# Patient Record
Sex: FEMALE | Race: WHITE | Employment: OTHER | URBAN - METROPOLITAN AREA
[De-identification: names, ages, dates, MRNs, and addresses within clinical notes are randomized per-mention and may not be internally consistent; named-entity substitution may affect disease eponyms.]

---

## 2018-12-26 ENCOUNTER — HOSPITAL ENCOUNTER (EMERGENCY)
Age: 68
Discharge: HOME | End: 2018-12-26
Attending: EMERGENCY MEDICINE
Payer: MEDICARE

## 2018-12-26 ENCOUNTER — APPOINTMENT (OUTPATIENT)
Dept: GENERAL RADIOLOGY | Age: 68
End: 2018-12-26
Attending: EMERGENCY MEDICINE
Payer: MEDICARE

## 2018-12-26 VITALS
BODY MASS INDEX: 26.75 KG/M2 | HEIGHT: 66 IN | DIASTOLIC BLOOD PRESSURE: 84 MMHG | RESPIRATION RATE: 14 BRPM | TEMPERATURE: 97.6 F | HEART RATE: 66 BPM | OXYGEN SATURATION: 93 % | SYSTOLIC BLOOD PRESSURE: 173 MMHG | WEIGHT: 166.45 LBS

## 2018-12-26 DIAGNOSIS — K21.9 GASTROESOPHAGEAL REFLUX DISEASE, ESOPHAGITIS PRESENCE NOT SPECIFIED: Primary | ICD-10-CM

## 2018-12-26 LAB
ALBUMIN SERPL-MCNC: 3.5 G/DL (ref 3.5–5)
ALBUMIN/GLOB SERPL: 1.2 {RATIO} (ref 1.1–2.2)
ALP SERPL-CCNC: 73 U/L (ref 45–117)
ALT SERPL-CCNC: 28 U/L (ref 12–78)
ANION GAP SERPL CALC-SCNC: 9 MMOL/L (ref 5–15)
APPEARANCE UR: CLEAR
AST SERPL-CCNC: 15 U/L (ref 15–37)
ATRIAL RATE: 66 BPM
BASOPHILS # BLD: 0 K/UL (ref 0–0.1)
BASOPHILS NFR BLD: 0 % (ref 0–1)
BILIRUB SERPL-MCNC: 0.4 MG/DL (ref 0.2–1)
BILIRUB UR QL: NEGATIVE
BUN SERPL-MCNC: 14 MG/DL (ref 6–20)
BUN/CREAT SERPL: 20 (ref 12–20)
CALCIUM SERPL-MCNC: 8.5 MG/DL (ref 8.5–10.1)
CALCULATED P AXIS, ECG09: 49 DEGREES
CALCULATED R AXIS, ECG10: 12 DEGREES
CALCULATED T AXIS, ECG11: 18 DEGREES
CHLORIDE SERPL-SCNC: 101 MMOL/L (ref 97–108)
CO2 SERPL-SCNC: 29 MMOL/L (ref 21–32)
COLOR UR: NORMAL
COMMENT, HOLDF: NORMAL
CREAT SERPL-MCNC: 0.69 MG/DL (ref 0.55–1.02)
DIAGNOSIS, 93000: NORMAL
DIFFERENTIAL METHOD BLD: ABNORMAL
EOSINOPHIL # BLD: 0.2 K/UL (ref 0–0.4)
EOSINOPHIL NFR BLD: 3 % (ref 0–7)
ERYTHROCYTE [DISTWIDTH] IN BLOOD BY AUTOMATED COUNT: 13.2 % (ref 11.5–14.5)
FLUAV AG NPH QL IA: NEGATIVE
FLUBV AG NOSE QL IA: NEGATIVE
GLOBULIN SER CALC-MCNC: 2.9 G/DL (ref 2–4)
GLUCOSE SERPL-MCNC: 103 MG/DL (ref 65–100)
GLUCOSE UR STRIP.AUTO-MCNC: NEGATIVE MG/DL
HCT VFR BLD AUTO: 40.1 % (ref 35–47)
HGB BLD-MCNC: 13.5 G/DL (ref 11.5–16)
HGB UR QL STRIP: NEGATIVE
IMM GRANULOCYTES # BLD: 0 K/UL (ref 0–0.04)
IMM GRANULOCYTES NFR BLD AUTO: 0 % (ref 0–0.5)
KETONES UR QL STRIP.AUTO: NEGATIVE MG/DL
LDH SERPL L TO P-CCNC: 230 U/L (ref 81–246)
LEUKOCYTE ESTERASE UR QL STRIP.AUTO: NEGATIVE
LIPASE SERPL-CCNC: 151 U/L (ref 73–393)
LYMPHOCYTES # BLD: 3.1 K/UL (ref 0.8–3.5)
LYMPHOCYTES NFR BLD: 35 % (ref 12–49)
MCH RBC QN AUTO: 29.8 PG (ref 26–34)
MCHC RBC AUTO-ENTMCNC: 33.7 G/DL (ref 30–36.5)
MCV RBC AUTO: 88.5 FL (ref 80–99)
MONOCYTES # BLD: 0.7 K/UL (ref 0–1)
MONOCYTES NFR BLD: 8 % (ref 5–13)
NEUTS SEG # BLD: 4.7 K/UL (ref 1.8–8)
NEUTS SEG NFR BLD: 54 % (ref 32–75)
NITRITE UR QL STRIP.AUTO: NEGATIVE
NRBC # BLD: 0 K/UL (ref 0–0.01)
NRBC BLD-RTO: 0 PER 100 WBC
P-R INTERVAL, ECG05: 164 MS
PH UR STRIP: 6 [PH] (ref 5–8)
PLATELET # BLD AUTO: 236 K/UL (ref 150–400)
PMV BLD AUTO: 8.8 FL (ref 8.9–12.9)
POTASSIUM SERPL-SCNC: 3.8 MMOL/L (ref 3.5–5.1)
PROT SERPL-MCNC: 6.4 G/DL (ref 6.4–8.2)
PROT UR STRIP-MCNC: NEGATIVE MG/DL
Q-T INTERVAL, ECG07: 390 MS
QRS DURATION, ECG06: 80 MS
QTC CALCULATION (BEZET), ECG08: 408 MS
RBC # BLD AUTO: 4.53 M/UL (ref 3.8–5.2)
SAMPLES BEING HELD,HOLD: NORMAL
SODIUM SERPL-SCNC: 139 MMOL/L (ref 136–145)
SP GR UR REFRACTOMETRY: 1.01 (ref 1–1.03)
TROPONIN I SERPL-MCNC: <0.05 NG/ML
URATE SERPL-MCNC: 3.3 MG/DL (ref 2.6–6)
UROBILINOGEN UR QL STRIP.AUTO: 0.2 EU/DL (ref 0.2–1)
VENTRICULAR RATE, ECG03: 66 BPM
WBC # BLD AUTO: 8.7 K/UL (ref 3.6–11)

## 2018-12-26 PROCEDURE — 83615 LACTATE (LD) (LDH) ENZYME: CPT

## 2018-12-26 PROCEDURE — 99284 EMERGENCY DEPT VISIT MOD MDM: CPT

## 2018-12-26 PROCEDURE — 87804 INFLUENZA ASSAY W/OPTIC: CPT

## 2018-12-26 PROCEDURE — 84550 ASSAY OF BLOOD/URIC ACID: CPT

## 2018-12-26 PROCEDURE — 71046 X-RAY EXAM CHEST 2 VIEWS: CPT

## 2018-12-26 PROCEDURE — 80053 COMPREHEN METABOLIC PANEL: CPT

## 2018-12-26 PROCEDURE — 83690 ASSAY OF LIPASE: CPT

## 2018-12-26 PROCEDURE — 84484 ASSAY OF TROPONIN QUANT: CPT

## 2018-12-26 PROCEDURE — 93005 ELECTROCARDIOGRAM TRACING: CPT

## 2018-12-26 PROCEDURE — 85025 COMPLETE CBC W/AUTO DIFF WBC: CPT

## 2018-12-26 PROCEDURE — 81003 URINALYSIS AUTO W/O SCOPE: CPT

## 2018-12-26 PROCEDURE — 36415 COLL VENOUS BLD VENIPUNCTURE: CPT

## 2018-12-26 RX ORDER — AA/PROT/LYSINE/METHIO/VIT C/B6 50-12.5 MG
10 TABLET ORAL
COMMUNITY
End: 2019-08-29 | Stop reason: SDUPTHER

## 2018-12-26 RX ORDER — CHOLECALCIFEROL TAB 125 MCG (5000 UNIT) 125 MCG
5000 TAB ORAL DAILY
COMMUNITY

## 2018-12-26 RX ORDER — SIMVASTATIN 10 MG/1
10 TABLET, FILM COATED ORAL
COMMUNITY
End: 2019-06-18 | Stop reason: SDUPTHER

## 2018-12-26 RX ORDER — GLUCOSAMINE/CHONDR SU A SOD 750-600 MG
2500 TABLET ORAL
COMMUNITY

## 2018-12-26 RX ORDER — HYDROGEN PEROXIDE 3 %
20 SOLUTION, NON-ORAL MISCELLANEOUS DAILY
Qty: 20 CAP | Refills: 0 | Status: SHIPPED | OUTPATIENT
Start: 2018-12-26 | End: 2019-01-15

## 2018-12-26 RX ORDER — ASPIRIN 81 MG/1
81 TABLET ORAL DAILY
COMMUNITY

## 2018-12-26 NOTE — ED PROVIDER NOTES
76 y.o. Female with a hx of lymphoma, not currently on chemo, in remission, GERD, with hx of prior PPI but not now presents to the ED noting a 5 day history of waxing and waning epigastric pain that she describes as a tightness that wraps around the top of her back. She states that she her sx are worse after eating and during the night. She has associated nausea but no vomiting, no diarrhea or constipation no blood in her stool She has been taking pepto bismol and tums intermittently as well as taking some of her 's prilosec to good effect to her sx. She denies any pain currently, states that she took this medication PTA. She says that last night she did feel some night sweats, some myalgias, and some light-headedness with position changes, but denies any HA, vision changes, numbness, weakness, chest pain, SOB, fever, chills, URI sx, sore throat, cough dysuria, hematuria, or other medical complaints. No hx of prior ACS or other comorbidities. Occasional ETOH intake. No hx of HTN, on no antihypertensives. Patient states that she feels anxious and is planning an extended trip to [facility] and wants to be checked out prior to leaving. She used to follow with a GI doctor in [place], but has not seen them in a while. Additionally she states that she has had a sick contact with a relative who was recently diagnosed with the flu about 2 weeks ago. History reviewed. No pertinent past medical history. History reviewed. No pertinent surgical history. History reviewed. No pertinent family history.     Social History     Socioeconomic History    Marital status:      Spouse name: Not on file    Number of children: Not on file    Years of education: Not on file    Highest education level: Not on file   Social Needs    Financial resource strain: Not on file    Food insecurity - worry: Not on file    Food insecurity - inability: Not on file    Transportation needs - medical: Not on file   Freda Transportation needs - non-medical: Not on file   Occupational History    Not on file   Tobacco Use    Smoking status: Never Smoker    Smokeless tobacco: Never Used   Substance and Sexual Activity    Alcohol use: Yes     Alcohol/week: 3.6 oz     Types: 6 Glasses of wine per week     Frequency: Never    Drug use: No    Sexual activity: Not on file   Other Topics Concern    Not on file   Social History Narrative    Not on file         ALLERGIES: Other plant, animal, environmental    Review of Systems   Constitutional: Positive for appetite change and diaphoresis (night sweats). Negative for activity change, chills and fever. HENT: Negative for congestion, rhinorrhea, sinus pressure, sneezing and sore throat. Eyes: Negative for photophobia and visual disturbance. Respiratory: Positive for chest tightness. Negative for cough and shortness of breath. Cardiovascular: Negative for chest pain, palpitations and leg swelling. Gastrointestinal: Positive for abdominal pain and nausea. Negative for abdominal distention, blood in stool, constipation, diarrhea and vomiting. Genitourinary: Negative for difficulty urinating, dysuria, flank pain, frequency, hematuria, menstrual problem, urgency, vaginal bleeding and vaginal discharge. Musculoskeletal: Negative for arthralgias, back pain, myalgias and neck pain. Skin: Negative for rash and wound. Neurological: Positive for light-headedness. Negative for dizziness, syncope, facial asymmetry, speech difficulty, weakness, numbness and headaches. Psychiatric/Behavioral: Negative for self-injury and suicidal ideas. All other systems reviewed and are negative. Vitals:    12/26/18 1021   BP: (!) 206/107   Pulse: 70   Resp: 14   Temp: 97.6 °F (36.4 °C)   SpO2: 98%   Weight: 75.5 kg (166 lb 7.2 oz)   Height: 5' 6.14\" (1.68 m)            Physical Exam   Constitutional: She is oriented to person, place, and time. She appears well-developed and well-nourished. No distress. HENT:   Head: Normocephalic and atraumatic. Nose: Nose normal.   Mouth/Throat: Oropharynx is clear and moist.   Eyes: Conjunctivae and EOM are normal. Pupils are equal, round, and reactive to light. Neck: Neck supple. Cardiovascular: Normal rate, regular rhythm, normal heart sounds and intact distal pulses. Pulmonary/Chest: Effort normal and breath sounds normal.   Abdominal: Soft. She exhibits no distension. There is no tenderness. There is no CVA tenderness. Musculoskeletal: She exhibits no edema or tenderness. Neurological: She is alert and oriented to person, place, and time. She has normal strength. No cranial nerve deficit or sensory deficit. Coordination normal. GCS eye subscore is 4. GCS verbal subscore is 5. GCS motor subscore is 6. Skin: Skin is warm and dry. She is not diaphoretic. Nursing note and vitals reviewed. MDM    76 y.o. female with hx of prior GERD, lymphoma and recent ill contact presents with intermittent upper abd pain and chest tightness. Seems to be associated with eating, atypical chest tightness. Reassuring exam, benign abdomen. BP initially elevated    Labs were drawn and returned showing no acute abnormalities, including neg troponin, normal lipase, LFTs, electrolytes, renal fxn, normal uric acid, and LDH. CXR was done, viewed by myself and read by radiology showing no acute abnormalities. Influenza neg    UA neg    EKG at 1025 shows NSR with a rate of 66bpm with no acute ST or T wave abnormalities suggestive of ischemia, no arrhythmia. BP improved with rest without intervention. rec'd PCP follow up for further evaluation of HTN.      Procedures

## 2018-12-26 NOTE — ED NOTES
The patient was discharged home by Dr Ana M Ivey in stable condition. The patient is alert and oriented, in no respiratory distress and discharge vital signs obtained. The patient's diagnosis, condition and treatment were explained. The patient expressed understanding. One prescription given. A discharge plan has been developed. Aftercare instructions were given. Pt ambulatory out of the ED.

## 2019-03-29 ENCOUNTER — HOSPITAL ENCOUNTER (OUTPATIENT)
Dept: GENERAL RADIOLOGY | Age: 69
Discharge: HOME | End: 2019-03-29
Payer: MEDICARE

## 2019-03-29 ENCOUNTER — OFFICE VISIT (OUTPATIENT)
Dept: PRIMARY CARE CLINIC | Age: 69
End: 2019-03-29

## 2019-03-29 VITALS
WEIGHT: 165.2 LBS | TEMPERATURE: 98.3 F | OXYGEN SATURATION: 92 % | RESPIRATION RATE: 16 BRPM | BODY MASS INDEX: 27.52 KG/M2 | SYSTOLIC BLOOD PRESSURE: 158 MMHG | HEIGHT: 65 IN | HEART RATE: 81 BPM | DIASTOLIC BLOOD PRESSURE: 85 MMHG

## 2019-03-29 DIAGNOSIS — R05.9 COUGH: ICD-10-CM

## 2019-03-29 DIAGNOSIS — R09.81 NASAL CONGESTION: ICD-10-CM

## 2019-03-29 DIAGNOSIS — J06.9 ACUTE RESPIRATORY DISEASE: ICD-10-CM

## 2019-03-29 DIAGNOSIS — R07.81 PLEURITIC PAIN: ICD-10-CM

## 2019-03-29 DIAGNOSIS — J06.9 UPPER RESPIRATORY TRACT INFECTION, UNSPECIFIED TYPE: Primary | ICD-10-CM

## 2019-03-29 PROCEDURE — 71046 X-RAY EXAM CHEST 2 VIEWS: CPT

## 2019-03-29 RX ORDER — FLUTICASONE PROPIONATE 50 MCG
SPRAY, SUSPENSION (ML) NASAL
Qty: 1 BOTTLE | Refills: 0 | Status: SHIPPED | OUTPATIENT
Start: 2019-03-29 | End: 2021-07-27

## 2019-03-29 RX ORDER — BENZONATATE 200 MG/1
200 CAPSULE ORAL
Qty: 21 CAP | Refills: 0 | Status: SHIPPED | OUTPATIENT
Start: 2019-03-29 | End: 2019-04-05

## 2019-03-29 RX ORDER — SIMVASTATIN 20 MG/1
TABLET, FILM COATED ORAL
COMMUNITY
Start: 2019-02-10 | End: 2019-08-29 | Stop reason: SDUPTHER

## 2019-03-29 RX ORDER — CHOLECALCIFEROL (VITAMIN D3) 125 MCG
CAPSULE ORAL
COMMUNITY

## 2019-03-29 RX ORDER — CETIRIZINE HCL 10 MG
10 TABLET ORAL
Qty: 30 TAB | Refills: 1 | Status: SHIPPED | OUTPATIENT
Start: 2019-03-29

## 2019-03-29 RX ORDER — ASPIRIN 81 MG/1
TABLET ORAL DAILY
COMMUNITY
End: 2019-08-29 | Stop reason: SDUPTHER

## 2019-03-29 RX ORDER — ALPRAZOLAM 0.25 MG/1
TABLET ORAL
COMMUNITY
End: 2019-06-18 | Stop reason: SDUPTHER

## 2019-03-29 RX ORDER — AZITHROMYCIN 250 MG/1
TABLET, FILM COATED ORAL
Qty: 6 TAB | Refills: 0 | Status: SHIPPED | OUTPATIENT
Start: 2019-03-29 | End: 2019-04-03

## 2019-03-29 RX ORDER — AA/PROT/LYSINE/METHIO/VIT C/B6 50-12.5 MG
10 TABLET ORAL DAILY
COMMUNITY

## 2019-03-29 NOTE — PROGRESS NOTES
[facility]   [address] [facility], [facility]  P: [phone]  F: [phone]      Chief Complaint   Patient presents with    Cold Symptoms     started with cold symtoms but denies and fever, nausea. states that started 5 days ago and the cough is causing pain. states congested in chest and head.   ear pain as well. Alejandro King is a 76 y.o. female who presents to clinic for Cold Symptoms (started with cold symtoms but denies and fever, nausea. states that started 5 days ago and the cough is causing pain. states congested in chest and head.   ear pain as well. ). HPI:  The patient presents for walk-in acute care visit with 5-6 days of days of chest congestion, chest tightness, and cough productive of sputum. She is endorsing rib/ pleuritic pain to left side and wondering if she has pneumonia. She is using OTC Vicks, Zycam, vitamin C, Advil, pseudophed, and robitussin with minimal relief. Hx of non hodgkins lymphoma in remission follwed by Ajay Broussard. There are no active problems to display for this patient.     Past Medical History:   Diagnosis Date    Cancer ([address]     non hodskins folicular lymphoma     Past Surgical History:   Procedure Laterality Date    BREAST SURGERY PROCEDURE UNLISTED      lump from breast benign    HX GYN      partial hysterectomy     Social History     Socioeconomic History    Marital status: UNKNOWN     Spouse name: Not on file    Number of children: Not on file    Years of education: Not on file    Highest education level: Not on file   Occupational History    Not on file   Social Needs    Financial resource strain: Not on file    Food insecurity:     Worry: Not on file     Inability: Not on file    Transportation needs:     Medical: Not on file     Non-medical: Not on file   Tobacco Use    Smoking status: Former Smoker     Last attempt to quit: 3/29/2004     Years since quitting: 15.0    Smokeless tobacco: Never Used   Substance and Sexual Activity    Alcohol use: Yes    Drug use: Never    Sexual activity: Not on file   Lifestyle    Physical activity:     Days per week: Not on file     Minutes per session: Not on file    Stress: Not on file   Relationships    Social connections:     Talks on phone: Not on file     Gets together: Not on file     Attends Spiritism service: Not on file     Active member of club or organization: Not on file     Attends meetings of clubs or organizations: Not on file     Relationship status: Not on file    Intimate partner violence:     Fear of current or ex partner: Not on file     Emotionally abused: Not on file     Physically abused: Not on file     Forced sexual activity: Not on file   Other Topics Concern    Not on file   Social History Narrative    Not on file     No family history on file. No Known Allergies    Current Outpatient Medications   Medication Sig Dispense Refill    biotin 100 mg/gram powd Take 5,000 mcg by mouth.  simvastatin (ZOCOR) 20 mg tablet       cholecalciferol, vitamin D3, (VITAMIN D3) 2,000 unit tab Take  by mouth.  coenzyme q10 (CO Q-10) 10 mg cap Take  by mouth.  aspirin delayed-release 81 mg tablet Take  by mouth daily.  benzonatate (TESSALON) 200 mg capsule Take 1 Cap by mouth three (3) times daily as needed for Cough for up to 7 days. 21 Cap 0    azithromycin (ZITHROMAX) 250 mg tablet Take 2 tablets today, then take 1 tablet daily 6 Tab 0    cetirizine (ZYRTEC) 10 mg tablet Take 1 Tab by mouth nightly. 30 Tab 1    fluticasone propionate (FLONASE) 50 mcg/actuation nasal spray Take as directed. 1 Bottle 0    ALPRAZolam (XANAX) 0.25 mg tablet alprazolam 0.25 mg tablet   TAKE 1 TABLET BY MOUTH TWICE A DAY AS NEEDED             The medications were reviewed and updated in the medical record. The past medical history, past surgical history, and family history were reviewed and updated in the medical record.     REVIEW OF SYSTEMS   Review of Systems   Constitutional: Negative for malaise/fatigue. HENT: Positive for congestion and sinus pain. Eyes: Negative for blurred vision and pain. Respiratory: Positive for cough. Negative for shortness of breath. Cardiovascular: Negative for chest pain and palpitations. Gastrointestinal: Negative for abdominal pain and heartburn. Genitourinary: Negative for frequency and urgency. Musculoskeletal: Positive for myalgias (rib). Negative for joint pain. Neurological: Negative for dizziness, tingling, sensory change, weakness and headaches. Psychiatric/Behavioral: Negative for depression, memory loss and substance abuse. PHYSICAL EXAM     Visit Vitals  /85 (BP 1 Location: Left arm, BP Patient Position: Sitting)   Pulse 81   Temp 98.3 °F (36.8 °C) (Oral)   Resp 16   Ht 5' 5\" (1.651 m)   Wt 165 lb 3.2 oz (74.9 kg)   SpO2 92%   BMI 27.49 kg/m²       Physical Exam   Constitutional: She is oriented to person, place, and time and well-developed, well-nourished, and in no distress. HENT:   Head: Normocephalic and atraumatic. Right Ear: External ear normal.   Left Ear: External ear normal.   Nose: Mucosal edema and rhinorrhea present. Mouth/Throat: Posterior oropharyngeal erythema present. Cardiovascular: Normal rate, regular rhythm and normal heart sounds. Pulmonary/Chest: Effort normal and breath sounds normal.   Musculoskeletal: Normal range of motion. She exhibits no edema. Arms:  Left lateral aspect of chest- wall, is tender to tough and hurts on deep inspiration or cough     Neurological: She is alert and oriented to person, place, and time. Gait normal.   Skin: Skin is warm and dry. Psychiatric: Affect and judgment normal.   Nursing note and vitals reviewed. ASSESSMENT/ PLAN   Diagnoses and all orders for this visit:    1. Upper respiratory tract infection, unspecified type  -     XR CHEST PA LAT;  Future  -     azithromycin (ZITHROMAX) 250 mg tablet; Take 2 tablets today, then take 1 tablet daily    2. Cough  -     benzonatate (TESSALON) 200 mg capsule; Take 1 Cap by mouth three (3) times daily as needed for Cough for up to 7 days. 3. Nasal congestion  -     cetirizine (ZYRTEC) 10 mg tablet; Take 1 Tab by mouth nightly. -     fluticasone propionate (FLONASE) 50 mcg/actuation nasal spray; Take as directed. 4. Pleuritic pain  -     XR CHEST PA LAT; Future      Monitor BP when well. Disclaimer:  Advised patient to call back or return to office if symptoms worsen/change/persist.  Discussed expected course/resolution/complications of diagnosis in detail with patient.     Medication risks/benefits/alternatives discussed with patient. Patient was given an after visit summary which includes diagnoses, current medications, & vitals.      Discussed patient instructions and advised to read to all patient instructions regarding care.      Patient expressed understanding with the diagnosis and plan. This note will not be viewable in [address]         Anneliese Caballero NP  3/29/2019        (This document has been electronically signed)

## 2019-03-29 NOTE — PROGRESS NOTES
Chief Complaint   Patient presents with    Cold Symptoms     started with cold symtoms but denies and fever, nausea. states that started 5 days ago and the cough is causing pain. states congested in chest and head.   ear pain as well.

## 2019-05-08 LAB — COLONOSCOPY, EXTERNAL: NORMAL

## 2019-06-18 ENCOUNTER — OFFICE VISIT (OUTPATIENT)
Dept: PRIMARY CARE CLINIC | Age: 69
End: 2019-06-18

## 2019-06-18 VITALS
TEMPERATURE: 97.7 F | HEIGHT: 65 IN | RESPIRATION RATE: 16 BRPM | DIASTOLIC BLOOD PRESSURE: 81 MMHG | SYSTOLIC BLOOD PRESSURE: 138 MMHG | WEIGHT: 162.4 LBS | BODY MASS INDEX: 27.06 KG/M2 | HEART RATE: 71 BPM | OXYGEN SATURATION: 96 %

## 2019-06-18 DIAGNOSIS — M81.0 POSTMENOPAUSAL BONE LOSS: ICD-10-CM

## 2019-06-18 DIAGNOSIS — E78.5 HYPERLIPIDEMIA, UNSPECIFIED HYPERLIPIDEMIA TYPE: Primary | ICD-10-CM

## 2019-06-18 DIAGNOSIS — F41.8 ANXIETY ABOUT HEALTH: ICD-10-CM

## 2019-06-18 DIAGNOSIS — Z82.49 FAMILY HISTORY OF HEART DISEASE: ICD-10-CM

## 2019-06-18 DIAGNOSIS — C82.90 FOLLICULAR LYMPHOMA, UNSPECIFIED FOLLICULAR LYMPHOMA TYPE, UNSPECIFIED BODY REGION (HCC): ICD-10-CM

## 2019-06-18 DIAGNOSIS — Z13.820 SCREENING FOR OSTEOPOROSIS: ICD-10-CM

## 2019-06-18 RX ORDER — ALPRAZOLAM 0.25 MG/1
TABLET ORAL
Qty: 30 TAB | Refills: 0 | Status: SHIPPED | OUTPATIENT
Start: 2019-06-18 | End: 2019-11-12 | Stop reason: SDUPTHER

## 2019-06-18 RX ORDER — SIMVASTATIN 20 MG/1
20 TABLET, FILM COATED ORAL
Qty: 90 TAB | Refills: 1 | Status: SHIPPED | OUTPATIENT
Start: 2019-06-18 | End: 2019-11-14 | Stop reason: ALTCHOICE

## 2019-06-18 NOTE — PROGRESS NOTES
[facility]   [address], [facility]  P: [phone]  F: [phone]      Chief Complaint   Patient presents with    Medication Refill     medication simvastatin. states that oncologist and other labs have been done and is having them released to us. Nithya Echeverria is a 76 y.o. female who presents to clinic for Medication Refill (medication simvastatin. states that oncologist and other labs have been done and is having them released to us.  ). HPI:    Kelley Briseno is a 43-year-old female who presents today for medication refill for simvastatin and Xanax. She states she will return for fasting lab work for her cholesterol. She has a history significant for follicular non-Hodgkin's lymphoma, hyperlipidemia, anxiety related to health, allergies, low vitamin D, hyperthyroid, and alopecia. She denies any acute complaints today. She does request routine DEXA scan and is interested in cardiac testing due to hyperlipidemia and family history of heart disease. There are no active problems to display for this patient.      Past Medical History:   Diagnosis Date    Cancer ([address]     non hodskins folicular lymphoma     Past Surgical History:   Procedure Laterality Date    BREAST SURGERY PROCEDURE UNLISTED      lump from breast benign    HX GYN      partial hysterectomy     Social History     Socioeconomic History    Marital status: UNKNOWN     Spouse name: Not on file    Number of children: Not on file    Years of education: Not on file    Highest education level: Not on file   Occupational History    Not on file   Social Needs    Financial resource strain: Not on file    Food insecurity:     Worry: Not on file     Inability: Not on file    Transportation needs:     Medical: Not on file     Non-medical: Not on file   Tobacco Use    Smoking status: Former Smoker     Last attempt to quit: 3/29/2004     Years since quitting: 15.2    Smokeless tobacco: Never Used   Substance and Sexual Activity    Alcohol use: Yes     Frequency: Never    Drug use: Never    Sexual activity: Not on file   Lifestyle    Physical activity:     Days per week: Not on file     Minutes per session: Not on file    Stress: Not on file   Relationships    Social connections:     Talks on phone: Not on file     Gets together: Not on file     Attends Orthodox service: Not on file     Active member of club or organization: Not on file     Attends meetings of clubs or organizations: Not on file     Relationship status: Not on file    Intimate partner violence:     Fear of current or ex partner: Not on file     Emotionally abused: Not on file     Physically abused: Not on file     Forced sexual activity: Not on file   Other Topics Concern    Not on file   Social History Narrative    ** Merged History Encounter **          History reviewed. No pertinent family history. Allergies   Allergen Reactions    Other Plant, Animal, Environmental Hives     metals       Current Outpatient Medications   Medication Sig Dispense Refill    simvastatin (ZOCOR) 20 mg tablet Take 1 Tab by mouth nightly. 90 Tab 1    ALPRAZolam (XANAX) 0.25 mg tablet alprazolam 0.25 mg tablet   TAKE 1 TABLET BY MOUTH TWICE A DAY AS NEEDED 30 Tab 0    biotin 100 mg/gram powd Take 5,000 mcg by mouth.  cholecalciferol, vitamin D3, (VITAMIN D3) 2,000 unit tab Take  by mouth.  coenzyme q10 (CO Q-10) 10 mg cap Take  by mouth.  aspirin delayed-release 81 mg tablet Take  by mouth daily.  cetirizine (ZYRTEC) 10 mg tablet Take 1 Tab by mouth nightly. 30 Tab 1    fluticasone propionate (FLONASE) 50 mcg/actuation nasal spray Take as directed. 1 Bottle 0    coenzyme q10 10 mg cap Take 10 mg by mouth.  Biotin 2,500 mcg cap Take 2,500 mg by mouth.  aspirin delayed-release 81 mg tablet Take 81 mg by mouth daily.       cholecalciferol, VITAMIN D3, (VITAMIN D3) 5,000 unit tab tablet Take 5,000 Units by mouth daily.      simvastatin (ZOCOR) 20 mg tablet              The medications were reviewed and updated in the medical record. The past medical history, past surgical history, and family history were reviewed and updated in the medical record. REVIEW OF SYSTEMS   Review of Systems   Constitutional: Negative for malaise/fatigue. HENT: Negative for congestion. Eyes: Negative for blurred vision and pain. Respiratory: Negative for cough and shortness of breath. Cardiovascular: Negative for chest pain and palpitations. Gastrointestinal: Negative for abdominal pain and heartburn. Genitourinary: Negative for frequency and urgency. Musculoskeletal: Negative for joint pain and myalgias. Neurological: Negative for dizziness, tingling, sensory change, weakness and headaches. Psychiatric/Behavioral: Negative for depression, memory loss and substance abuse. PHYSICAL EXAM     Visit Vitals  /81 (BP 1 Location: Left arm, BP Patient Position: Sitting)   Pulse 71   Temp 97.7 °F (36.5 °C) (Oral)   Resp 16   Ht 5' 5\" (1.651 m)   Wt 162 lb 6.4 oz (73.7 kg)   SpO2 96%   BMI 27.02 kg/m²       Physical Exam   Constitutional: She is oriented to person, place, and time and well-developed, well-nourished, and in no distress. BMI 27   HENT:   Head: Normocephalic and atraumatic. Right Ear: External ear normal.   Left Ear: External ear normal.   Cardiovascular: Normal rate, regular rhythm and normal heart sounds. Pulmonary/Chest: Effort normal and breath sounds normal.   Musculoskeletal: Normal range of motion. She exhibits no edema. Neurological: She is alert and oriented to person, place, and time. Gait normal.   Skin: Skin is warm and dry. Psychiatric: Affect and judgment normal.   Nursing note and vitals reviewed. ASSESSMENT/ PLAN   Diagnoses and all orders for this visit:    1. Hyperlipidemia, unspecified hyperlipidemia type  -     simvastatin (ZOCOR) 20 mg tablet;  Take 1 Tab by mouth nightly. -     LIPID PANEL  -     METABOLIC PANEL, COMPREHENSIVE  -     CT HEART W/O CONT WITH CALCIUM; Future    2. Anxiety about health  -     ALPRAZolam (XANAX) 0.25 mg tablet; alprazolam 0.25 mg tablet   TAKE 1 TABLET BY MOUTH TWICE A DAY AS NEEDED      -Encouraged her to use sparingly. She states she used about 30 pills in the last 6 months.  reviewed no concerns. 3. Screening for osteoporosis  -     DEXA BONE DENSITY STUDY AXIAL; Future    4. Follicular lymphoma, unspecified follicular lymphoma type, unspecified body region ([facility] [address]  -     DEXA BONE DENSITY STUDY AXIAL; Future    5. Postmenopausal bone loss  -     DEXA BONE DENSITY STUDY AXIAL; Future    6. Family history of heart disease  -     CT HEART W/O CONT WITH CALCIUM; Future    Disclaimer:  Advised patient to call back or return to office if symptoms worsen/change/persist.  Discussed expected course/resolution/complications of diagnosis in detail with patient.     Medication risks/benefits/alternatives discussed with patient. Patient was given an after visit summary which includes diagnoses, current medications, & vitals.      Discussed patient instructions and advised to read to all patient instructions regarding care.      Patient expressed understanding with the diagnosis and plan. This note will not be viewable in [address]         Ceci Parks NP  6/18/2019        (This document has been electronically signed)

## 2019-06-18 NOTE — PROGRESS NOTES
Chief Complaint   Patient presents with    Medication Refill     medication simvastatin. states that oncologist and other labs have been done and is having them released to us.        Wants a stress test and no need for thyroid, does want you to see and patient is wanting lipid panel done here

## 2019-08-29 ENCOUNTER — OFFICE VISIT (OUTPATIENT)
Dept: PRIMARY CARE CLINIC | Age: 69
End: 2019-08-29

## 2019-08-29 VITALS
HEART RATE: 73 BPM | OXYGEN SATURATION: 95 % | HEIGHT: 65 IN | BODY MASS INDEX: 27.32 KG/M2 | DIASTOLIC BLOOD PRESSURE: 79 MMHG | WEIGHT: 164 LBS | TEMPERATURE: 98.3 F | RESPIRATION RATE: 18 BRPM | SYSTOLIC BLOOD PRESSURE: 123 MMHG

## 2019-08-29 DIAGNOSIS — R20.0 LIP NUMBNESS: Primary | ICD-10-CM

## 2019-08-29 DIAGNOSIS — R20.8 DECREASED SENSATION: ICD-10-CM

## 2019-08-29 NOTE — PROGRESS NOTES
[facility]   [address]ain[address] Fam [address], [address] [place]  P: [phone]  F: [phone]      Chief Complaint   Patient presents with    Numbness     Pt c/o tingling and numbness in fingers/lips x's 24hrs. Denies any other symptoms       SUBJECTIVE   Kari Cote is a 71 y.o. female who presents to clinic for Numbness (Pt c/o tingling and numbness in fingers/lips x's 24hrs. Denies any other symptoms). HPI:    Fernando Lord is a 59-year-old female who presents today for numbness to tips of right thumb and index finger and lip tingling, for about 24 hours. She denies any other related symptoms including facial droop, speech alteration, unilateral weakness, chest pains, or shortness of breath. She denies any known environmental or food allergies, she was recently around all 8 of her grandkids, none of whom were sick at the time. She states her daughter was recently ill with common cold symptoms. She does drink shakes with turmeric and jed, and has staining of right index finger and thumb from where she puts the tumeric and jed in her shake. She denies any new medications or new beauty products. She states she did open a new tube (same kind) of toothpaste yesterday around the time the symptoms started. There are no active problems to display for this patient.     Past Medical History:   Diagnosis Date    Cancer ([facility] [address]     non hodskins folicular lymphoma     Past Surgical History:   Procedure Laterality Date    BREAST SURGERY PROCEDURE UNLISTED      lump from breast benign    HX GYN      partial hysterectomy     Social History     Socioeconomic History    Marital status: UNKNOWN     Spouse name: Not on file    Number of children: Not on file    Years of education: Not on file    Highest education level: Not on file   Occupational History    Not on file   Social Needs    Financial resource strain: Not on file    Food insecurity:     Worry: Not on file     Inability: Not on file  Transportation needs:     Medical: Not on file     Non-medical: Not on file   Tobacco Use    Smoking status: Former Smoker     Last attempt to quit: 3/29/2004     Years since quitting: 15.4    Smokeless tobacco: Never Used   Substance and Sexual Activity    Alcohol use: Yes     Frequency: Never    Drug use: Never    Sexual activity: Not on file   Lifestyle    Physical activity:     Days per week: Not on file     Minutes per session: Not on file    Stress: Not on file   Relationships    Social connections:     Talks on phone: Not on file     Gets together: Not on file     Attends [employer] service: Not on file     Active member of club or organization: Not on file     Attends meetings of clubs or organizations: Not on file     Relationship status: Not on file    Intimate partner violence:     Fear of current or ex partner: Not on file     Emotionally abused: Not on file     Physically abused: Not on file     Forced sexual activity: Not on file   Other Topics Concern    Not on file   Social History Narrative    ** Merged History Encounter **          History reviewed. No pertinent family history. Allergies   Allergen Reactions    Niacin Preparations Rash and Swelling    Other Plant, Animal, Environmental Hives     metals    Niacin Angioedema, Rash and Swelling       Current Outpatient Medications   Medication Sig Dispense Refill    simvastatin (ZOCOR) 20 mg tablet Take 1 Tab by mouth nightly. 90 Tab 1    biotin 100 mg/gram powd Take 10,000 mcg by mouth.  cholecalciferol, vitamin D3, (VITAMIN D3) 2,000 unit tab Take  by mouth.  coenzyme q10 (CO Q-10) 10 mg cap Take  by mouth.  ALPRAZolam (XANAX) 0.25 mg tablet alprazolam 0.25 mg tablet   TAKE 1 TABLET BY MOUTH TWICE A DAY AS NEEDED 30 Tab 0    cetirizine (ZYRTEC) 10 mg tablet Take 1 Tab by mouth nightly. 30 Tab 1    fluticasone propionate (FLONASE) 50 mcg/actuation nasal spray Take as directed.  1 Bottle 0    Biotin 2,500 mcg cap Take 2,500 mg by mouth.  aspirin delayed-release 81 mg tablet Take 81 mg by mouth daily.  cholecalciferol, VITAMIN D3, (VITAMIN D3) 5,000 unit tab tablet Take 5,000 Units by mouth daily. The medications were reviewed and updated in the medical record. The past medical history, past surgical history, and family history were reviewed and updated in the medical record. REVIEW OF SYSTEMS   Review of Systems   Constitutional: Negative for fever and malaise/fatigue. HENT: Negative for congestion. Eyes: Negative for blurred vision and pain. Respiratory: Negative for cough and shortness of breath. Cardiovascular: Negative for chest pain and palpitations. Gastrointestinal: Negative for abdominal pain and heartburn. Genitourinary: Negative for frequency and urgency. Musculoskeletal: Negative for joint pain and myalgias. Neurological: Positive for tingling and sensory change. Negative for dizziness, loss of consciousness, weakness and headaches. Psychiatric/Behavioral: Negative for depression, memory loss and substance abuse. PHYSICAL EXAM     Visit Vitals  /79 (BP 1 Location: Left arm, BP Patient Position: Sitting)   Pulse 73   Temp 98.3 °F (36.8 °C) (Oral)   Resp 18   Ht 5' 5\" (1.651 m)   Wt 164 lb (74.4 kg)   SpO2 95%   BMI 27.29 kg/m²       Physical Exam   Constitutional: She is oriented to person, place, and time and well-developed, well-nourished, and in no distress. HENT:   Head: Normocephalic and atraumatic. Right Ear: External ear normal.   Left Ear: External ear normal.   Mouth/Throat: Uvula is midline and oropharynx is clear and moist.   Endorses mild tingling to upper lip. No obvious swelling or lesions present to lips. Cardiovascular: Normal rate, regular rhythm and normal heart sounds. Pulses:       Radial pulses are 2+ on the right side, and 2+ on the left side.    Pulmonary/Chest: Effort normal and breath sounds normal.   Musculoskeletal: Normal range of motion. She exhibits no edema. Exhibits mild decreased sensation to right thumb and right index finger. Has full range of motion with both upper extremities, equal  strength. Neurological: She is alert and oriented to person, place, and time. She has intact cranial nerves. Gait normal.   Reflex Scores:       Tricep reflexes are 2+ on the right side and 2+ on the left side. Patellar reflexes are 2+ on the right side and 2+ on the left side. Skin: Skin is warm and dry. Psychiatric: Affect and judgment normal.   Nursing note and vitals reviewed. ASSESSMENT/ PLAN   Diagnoses and all orders for this visit:    1. Lip numbness  -     REFERRAL TO NEUROLOGY    2. Decreased sensation       -Distal aspect of right thumb and right index finger.  -     REFERRAL TO NEUROLOGY    Discussed possible environmental or food allergy, that she touched with fingers and then introduced to her mouth. Discussed concerning symptoms for a stroke and for anaphylaxis, patient to report to the ER if experiencing any similar symptoms. I want her to try an OTC antihistamine. If persisting or worsening, she should follow-up with neurology for further eval.       Disclaimer:  Advised patient to call back or return to office if symptoms worsen/change/persist.  Discussed expected course/resolution/complications of diagnosis in detail with patient.     Medication risks/benefits/alternatives discussed with patient. Patient was given an after visit summary which includes diagnoses, current medications, & vitals.      Discussed patient instructions and advised to read to all patient instructions regarding care.      Patient expressed understanding with the diagnosis and plan. This note will not be viewable in [address]         Janell Richardson NP  8/29/2019        (This document has been electronically signed)

## 2019-08-29 NOTE — PROGRESS NOTES
All health maintenance and other pertinent information has been reviewed in preparation for today's office visit. Patient presents in the office today for:    Chief Complaint   Patient presents with    Numbness     Pt c/o tingling and numbness in fingers/lips x's      1. Have you been to the ER, urgent care clinic since your last visit? Hospitalized since your last visit? No    2. Have you seen or consulted any other health care providers outside of the [address] [place] [address] since your last visit? Include any pap smears or colon screening.  No

## 2019-09-10 LAB
ALBUMIN SERPL-MCNC: 4.4 G/DL (ref 3.6–4.8)
ALBUMIN/GLOB SERPL: 2.6 {RATIO} (ref 1.2–2.2)
ALP SERPL-CCNC: 73 IU/L (ref 39–117)
ALT SERPL-CCNC: 15 IU/L (ref 0–32)
AST SERPL-CCNC: 17 IU/L (ref 0–40)
BILIRUB SERPL-MCNC: 0.5 MG/DL (ref 0–1.2)
BUN SERPL-MCNC: 17 MG/DL (ref 8–27)
BUN/CREAT SERPL: 22 (ref 12–28)
CALCIUM SERPL-MCNC: 9.2 MG/DL (ref 8.7–10.3)
CHLORIDE SERPL-SCNC: 103 MMOL/L (ref 96–106)
CHOLEST SERPL-MCNC: 201 MG/DL (ref 100–199)
CO2 SERPL-SCNC: 23 MMOL/L (ref 20–29)
CREAT SERPL-MCNC: 0.77 MG/DL (ref 0.57–1)
GLOBULIN SER CALC-MCNC: 1.7 G/DL (ref 1.5–4.5)
GLUCOSE SERPL-MCNC: 89 MG/DL (ref 65–99)
HDLC SERPL-MCNC: 69 MG/DL
LDLC SERPL CALC-MCNC: 118 MG/DL (ref 0–99)
POTASSIUM SERPL-SCNC: 4.4 MMOL/L (ref 3.5–5.2)
PROT SERPL-MCNC: 6.1 G/DL (ref 6–8.5)
SODIUM SERPL-SCNC: 140 MMOL/L (ref 134–144)
TRIGL SERPL-MCNC: 69 MG/DL (ref 0–149)
VLDLC SERPL CALC-MCNC: 14 MG/DL (ref 5–40)

## 2019-09-17 ENCOUNTER — OFFICE VISIT (OUTPATIENT)
Dept: PRIMARY CARE CLINIC | Age: 69
End: 2019-09-17

## 2019-09-17 VITALS
DIASTOLIC BLOOD PRESSURE: 82 MMHG | RESPIRATION RATE: 16 BRPM | OXYGEN SATURATION: 92 % | BODY MASS INDEX: 27.56 KG/M2 | HEART RATE: 64 BPM | WEIGHT: 165.4 LBS | SYSTOLIC BLOOD PRESSURE: 134 MMHG | TEMPERATURE: 98.5 F | HEIGHT: 65 IN

## 2019-09-17 DIAGNOSIS — J32.0 MAXILLARY SINUSITIS, UNSPECIFIED CHRONICITY: Primary | ICD-10-CM

## 2019-09-17 RX ORDER — AZITHROMYCIN 250 MG/1
TABLET, FILM COATED ORAL
Qty: 6 TAB | Refills: 0 | Status: SHIPPED | OUTPATIENT
Start: 2019-09-17 | End: 2019-09-22

## 2019-09-17 NOTE — PROGRESS NOTES
[facility]   Daina Fam [address] [facility] [place] [address] [place] [address]  P: [phone]  F: [phone]      Chief Complaint   Patient presents with    Sinus Pain     going out of town and is afraid of sinus infection started yesterday with congestion and sore throat and the face hurts. Eliazar Leger is a 71 y.o. female who presents to clinic for Sinus Pain (going out of town and is afraid of sinus infection started yesterday with congestion and sore throat and the face hurts. ). HPI:    Alexa Rodriguez is a 25-year-old female who presents today for 2 to 3 days of sinus congestion. She is going out of town for 2 weeks and is fearful that she is developing a sinus infection. Her  was recently diagnosed with a sinus infection and started on Augmentin, and she was recently exposed to a 3year-old grandson who had sinusitis as well. She has tried Zyrtec daily for the last several days, but has not been taking her Flonase consistently. She has not noted any improvement in symptoms. she used Afrin and Sudafed yesterday. She denies fevers or any other associated symptoms. She is requesting a Z-Yury as it is worked well in the past for her sinuses. There are no active problems to display for this patient.          Past Medical History:   Diagnosis Date    Cancer ([address]     non hodskins folicular lymphoma     Past Surgical History:   Procedure Laterality Date    BREAST SURGERY PROCEDURE UNLISTED      lump from breast benign    HX GYN      partial hysterectomy     Social History     Socioeconomic History    Marital status: UNKNOWN     Spouse name: Not on file    Number of children: Not on file    Years of education: Not on file    Highest education level: Not on file   Occupational History    Not on file   Social Needs    Financial resource strain: Not on file    Food insecurity:     Worry: Not on file     Inability: Not on file    Transportation needs:     Medical: Not on file Non-medical: Not on file   Tobacco Use    Smoking status: Former Smoker     Last attempt to quit: 3/29/2004     Years since quitting: 15.4    Smokeless tobacco: Never Used   Substance and Sexual Activity    Alcohol use: Yes     Frequency: Never    Drug use: Never    Sexual activity: Not on file   Lifestyle    Physical activity:     Days per week: Not on file     Minutes per session: Not on file    Stress: Not on file   Relationships    Social connections:     Talks on phone: Not on file     Gets together: Not on file     Attends Jew service: Not on file     Active member of club or organization: Not on file     Attends meetings of clubs or organizations: Not on file     Relationship status: Not on file    Intimate partner violence:     Fear of current or ex partner: Not on file     Emotionally abused: Not on file     Physically abused: Not on file     Forced sexual activity: Not on file   Other Topics Concern    Not on file   Social History Narrative    ** Merged History Encounter **          History reviewed. No pertinent family history. Allergies   Allergen Reactions    Niacin Preparations Rash and Swelling    Other Plant, Animal, Environmental Hives     metals    Niacin Angioedema, Rash and Swelling       Current Outpatient Medications   Medication Sig Dispense Refill    azithromycin (ZITHROMAX) 250 mg tablet Take 2 tablets today, then take 1 tablet daily 6 Tab 0    simvastatin (ZOCOR) 20 mg tablet Take 1 Tab by mouth nightly. 90 Tab 1    ALPRAZolam (XANAX) 0.25 mg tablet alprazolam 0.25 mg tablet   TAKE 1 TABLET BY MOUTH TWICE A DAY AS NEEDED 30 Tab 0    biotin 100 mg/gram powd Take 10,000 mcg by mouth.  cholecalciferol, vitamin D3, (VITAMIN D3) 2,000 unit tab Take  by mouth.  coenzyme q10 (CO Q-10) 10 mg cap Take  by mouth.  cetirizine (ZYRTEC) 10 mg tablet Take 1 Tab by mouth nightly.  30 Tab 1    fluticasone propionate (FLONASE) 50 mcg/actuation nasal spray Take as directed. 1 Bottle 0    Biotin 2,500 mcg cap Take 2,500 mg by mouth.  aspirin delayed-release 81 mg tablet Take 81 mg by mouth daily.  cholecalciferol, VITAMIN D3, (VITAMIN D3) 5,000 unit tab tablet Take 5,000 Units by mouth daily. The medications were reviewed and updated in the medical record. The past medical history, past surgical history, and family history were reviewed and updated in the medical record. REVIEW OF SYSTEMS   Review of Systems   Constitutional: Negative for fever and malaise/fatigue. HENT: Positive for sinus pain. Negative for congestion. Eyes: Negative for blurred vision and pain. Respiratory: Negative for cough and shortness of breath. Cardiovascular: Negative for chest pain and palpitations. Gastrointestinal: Negative for abdominal pain and heartburn. Genitourinary: Negative for frequency and urgency. Musculoskeletal: Negative for joint pain and myalgias. Neurological: Negative for dizziness, tingling, sensory change, weakness and headaches. Psychiatric/Behavioral: Negative for depression, memory loss and substance abuse. PHYSICAL EXAM     Visit Vitals  /82 (BP 1 Location: Left arm, BP Patient Position: Sitting)   Pulse 64   Temp 98.5 °F (36.9 °C) (Oral)   Resp 16   Ht 5' 5\" (1.651 m)   Wt 165 lb 6.4 oz (75 kg)   SpO2 92%   BMI 27.52 kg/m²       Physical Exam   Constitutional: She is oriented to person, place, and time and well-developed, well-nourished, and in no distress. HENT:   Head: Normocephalic and atraumatic. Right Ear: Hearing, tympanic membrane, external ear and ear canal normal.   Left Ear: Hearing, tympanic membrane, external ear and ear canal normal.   Nose: Rhinorrhea present. Right sinus exhibits maxillary sinus tenderness. Left sinus exhibits maxillary sinus tenderness. Mouth/Throat: No posterior oropharyngeal edema. Cardiovascular: Normal rate, regular rhythm and normal heart sounds.    Pulmonary/Chest: Effort normal and breath sounds normal.   Musculoskeletal: Normal range of motion. She exhibits no edema. Neurological: She is alert and oriented to person, place, and time. Gait normal.   Skin: Skin is warm and dry. Psychiatric: Affect and judgment normal.   Nursing note and vitals reviewed. ASSESSMENT/ PLAN   Diagnoses and all orders for this visit:    1. Maxillary sinusitis, unspecified chronicity      -Encouraged 1 more week of OTC antihistamine, Flonase nasal spray, and sinus irrigations. Limit Afrin use to 3 days. Provided antibiotic if symptoms are not improved at the 10-day nadia, she can take. Encourage rest and hydration. -     azithromycin (ZITHROMAX) 250 mg tablet; Take 2 tablets today, then take 1 tablet daily      Disclaimer:  Advised patient to call back or return to office if symptoms worsen/change/persist.  Discussed expected course/resolution/complications of diagnosis in detail with patient.     Medication risks/benefits/alternatives discussed with patient. Patient was given an after visit summary which includes diagnoses, current medications, & vitals.      Discussed patient instructions and advised to read to all patient instructions regarding care.      Patient expressed understanding with the diagnosis and plan. This note will not be viewable in [address]         Sumaya Richardson NP  9/17/2019        (This document has been electronically signed)

## 2019-09-17 NOTE — PROGRESS NOTES
Chief Complaint   Patient presents with    Sinus Pain     going out of town and is afraid of sinus infection started yesterday with congestion and sore throat and the face hurts.

## 2019-10-29 ENCOUNTER — OFFICE VISIT (OUTPATIENT)
Dept: NEUROLOGY | Age: 69
End: 2019-10-29

## 2019-10-29 VITALS
HEIGHT: 65 IN | DIASTOLIC BLOOD PRESSURE: 84 MMHG | SYSTOLIC BLOOD PRESSURE: 131 MMHG | OXYGEN SATURATION: 93 % | HEART RATE: 74 BPM | RESPIRATION RATE: 16 BRPM | BODY MASS INDEX: 27.32 KG/M2 | WEIGHT: 164 LBS

## 2019-10-29 DIAGNOSIS — G45.9 TIA (TRANSIENT ISCHEMIC ATTACK): Primary | ICD-10-CM

## 2019-10-29 DIAGNOSIS — C85.90 NON-HODGKIN'S LYMPHOMA, UNSPECIFIED BODY REGION, UNSPECIFIED NON-HODGKIN LYMPHOMA TYPE (HCC): ICD-10-CM

## 2019-10-29 NOTE — PROGRESS NOTES
Chief Complaint   Patient presents with    Neurologic Problem         HISTORY OF PRESENT ILLNESS  Kenny Lee is a 71 y.o. female neurological consultation requested by Clarissa Oneill NP. She states that about 2 months ago she developed numbness in the right side around her mouth, mainly in the lips. At the same time she noticed numbness in the tips of her first and second digit of the right hand. The symptoms were consistent for about 2 to 3 days. Then they would come and go for about another week and then finally subsided. She did not have any associated headache, changes in speech, facial droop, focal motor or sensory deficits. She did not seek any immediate medical attention. She does get ocular migraines but there was no migraine associated with the symptoms. Has never had a stroke or TIA before. She does have runs lymphoma and has had chemo treatments. She is going for a follow-up with oncology in the near future. Past Medical History:   Diagnosis Date    Cancer ([facility] [address]     non hodskins folicular lymphoma     Current Outpatient Medications   Medication Sig    simvastatin (ZOCOR) 20 mg tablet Take 1 Tab by mouth nightly.  biotin 100 mg/gram powd Take 10,000 mcg by mouth.  cholecalciferol, vitamin D3, (VITAMIN D3) 2,000 unit tab Take  by mouth.  coenzyme q10 (CO Q-10) 10 mg cap Take  by mouth.  ALPRAZolam (XANAX) 0.25 mg tablet alprazolam 0.25 mg tablet   TAKE 1 TABLET BY MOUTH TWICE A DAY AS NEEDED    cetirizine (ZYRTEC) 10 mg tablet Take 1 Tab by mouth nightly.  fluticasone propionate (FLONASE) 50 mcg/actuation nasal spray Take as directed.  Biotin 2,500 mcg cap Take 2,500 mg by mouth.  aspirin delayed-release 81 mg tablet Take 81 mg by mouth daily.  cholecalciferol, VITAMIN D3, (VITAMIN D3) 5,000 unit tab tablet Take 5,000 Units by mouth daily. Indications: takes 10,000U     No current facility-administered medications for this visit.       Allergies   Allergen Reactions    Niacin Preparations Rash and Swelling    Other Plant, Animal, Environmental Hives     metals    Niacin Angioedema, Rash and Swelling     History reviewed. No pertinent family history. Social History     Tobacco Use    Smoking status: Former Smoker     Last attempt to quit: 3/29/2004     Years since quitting: 15.5    Smokeless tobacco: Never Used   Substance Use Topics    Alcohol use: Yes     Frequency: Never    Drug use: Never     Past Surgical History:   Procedure Laterality Date    BREAST SURGERY PROCEDURE UNLISTED      lump from breast benign    HX GYN      partial hysterectomy         REVIEW OF SYSTEMS  Review of Systems - History obtained from the patient  Psychological ROS: negative  ENT ROS: negative  Hematological and Lymphatic ROS: negative  Endocrine ROS: negative  Respiratory ROS: no cough, shortness of breath, or wheezing  Cardiovascular ROS: no chest pain or dyspnea on exertion  Gastrointestinal ROS: no abdominal pain, change in bowel habits, or black or bloody stools  Genito-Urinary ROS: no dysuria, trouble voiding, or hematuria  Musculoskeletal ROS: negative  Dermatological ROS: negative      PHYSICAL EXAMINATION:    Visit Vitals  /84   Pulse 74   Resp 16   Ht 5' 5\" (1.651 m)   Wt 74.4 kg (164 lb)   SpO2 93%   BMI 27.29 kg/m²     General:  Well defined, nourished, and groomed individual in no acute distress. Neck: Supple, nontender, no bruits, no pain with resistance to active range of motion. Heart: Regular rate and rhythm, no murmurs, rub, or gallop. Normal S1S2. Lungs:  Clear to auscultation bilaterally with equal chest expansion, no cough, no wheeze  Musculoskeletal:  Extremities revealed no edema and had full range of motion of joints. Psych:  Good mood and bright affect    NEUROLOGICAL EXAMINATION:     Mental Status:   Alert and oriented to person, place, and time. Attention span and concentration are normal. Speech is fluent.     Cranial Nerves:    II, III, IV, VI:  Visual acuity grossly intact. Visual fields are normal.    Pupils are equal, round, and reactive to light and accommodation. Extra-ocular movements are full and fluid. Fundoscopic exam was benign, no ptosis or nystagmus. V-XII: Hearing is grossly intact. Facial features are symmetric, with normal sensation and strength. The palate rises symmetrically and the tongue protrudes midline. Sternocleidomastoids 5/5. Motor Examination: Normal tone, bulk, and strength. 5/5 muscle strength throughout. No cogwheel rigidity or clonus present. Sensory exam:  Normal throughout to pinprick, temperature, and vibration sense. Normal proprioception. Coordination:  Finger to nose and rapid arm movement testing was normal.   No resting or intention tremor    Gait and Station:  Steady while walking on toes, heels, and with tandem walking. Normal arm swing. No Rhomberg or pronator drift. No muscle wasting or fasiculations noted. Reflexes:  DTRs 2+ throughout. Toes downgoing.         LABS / IMAGING  Lab Results   Component Value Date/Time    Cholesterol, total 201 (H) 09/09/2019 11:02 AM    HDL Cholesterol 69 09/09/2019 11:02 AM    LDL, calculated 118 (H) 09/09/2019 11:02 AM    VLDL, calculated 14 09/09/2019 11:02 AM    Triglyceride 69 09/09/2019 11:02 AM     Lab Results   Component Value Date/Time    WBC 8.7 12/26/2018 10:43 AM    HGB 13.5 12/26/2018 10:43 AM    HCT 40.1 12/26/2018 10:43 AM    PLATELET 014 40/33/0636 10:43 AM    MCV 88.5 12/26/2018 10:43 AM     Lab Results   Component Value Date/Time    Sodium 140 09/09/2019 11:02 AM    Potassium 4.4 09/09/2019 11:02 AM    Chloride 103 09/09/2019 11:02 AM    CO2 23 09/09/2019 11:02 AM    Anion gap 9 12/26/2018 10:43 AM    Glucose 89 09/09/2019 11:02 AM    BUN 17 09/09/2019 11:02 AM    Creatinine 0.77 09/09/2019 11:02 AM    BUN/Creatinine ratio 22 09/09/2019 11:02 AM    GFR est AA 91 09/09/2019 11:02 AM    GFR est non-AA 79 09/09/2019 11:02 AM    Calcium 9.2 09/09/2019 11:02 AM    Bilirubin, total 0.5 09/09/2019 11:02 AM    AST (SGOT) 17 09/09/2019 11:02 AM    Alk. phosphatase 73 09/09/2019 11:02 AM    Protein, total 6.1 09/09/2019 11:02 AM    Albumin 4.4 09/09/2019 11:02 AM    Globulin 2.9 12/26/2018 10:43 AM    A-G Ratio 2.6 (H) 09/09/2019 11:02 AM    ALT (SGPT) 15 09/09/2019 11:02 AM         ASSESSMENT    ICD-10-CM ICD-9-CM    1. TIA (transient ischemic attack) G45.9 435.9 MRI BRAIN WO CONT      CTA HEAD NECK W CONT      ECHO ADULT COMPLETE   2. Non-Hodgkin's lymphoma, unspecified body region, unspecified non-Hodgkin lymphoma type ([facility] [address] C85.90 202.80        DISCUSSION  Ms. Sukhwinder Faith had numbness around her mouth and in the right hand that lasted several days before completely resolving  Symptoms are concerning for a small subcortical CVA in the thalamic or brainstem region  I have recommended MRI scan of the brain to rule out an actual ischemic area or any other structural cause  We will also check CT of the head and neck to evaluate posterior circulation  Echocardiogram to rule out cardiac sources of emboli  Continue statin  Start baby aspirin daily    Thank you for allowing me to participate in the care of Ms. Delta Christine. Please feel free to contact me if you have any questions. I will be happy to follow to follow her along with you.       Lianne Dixon MD  Diplomate, American Board of Psychiatry & Neurology (Neurology)  Maylin Weber Board of Psychiatry & Neurology (Clinical Neurophysiology)  Diplomate, American Board of Electrodiagnostic Medicine

## 2019-10-29 NOTE — PROGRESS NOTES
Ms. Jenna Sylvester presents today as a new patient for evaluation of numbness around mouth and some digits of right hand. Depression screening done on patient.

## 2019-10-29 NOTE — PATIENT INSTRUCTIONS
PRESCRIPTION REFILL POLICY [facility] Statement to Patients April 1, 2014 In an effort to ensure the large volume of patient prescription refills is processed in the most efficient and expeditious manner, we are asking our patients to assist us by calling your Pharmacy for all prescription refills, this will include also your  Mail Order Pharmacy. The pharmacy will contact our office electronically to continue the refill process. Please do not wait until the last minute to call your pharmacy. We need at least 48 hours (2days) to fill prescriptions. We also encourage you to call your pharmacy before going to  your prescription to make sure it is ready. With regard to controlled substance prescription refill requests (narcotic refills) that need to be picked up at our office, we ask your cooperation by providing us with at least 72 hours (3days) notice that you will need a refill. We will not refill narcotic prescription refill requests after 4:00pm on any weekday, Monday through Thursday, or after 2:00pm on Fridays, or on the weekends. We encourage everyone to explore another way of getting your prescription refill request processed using [employer], our patient web portal through our electronic medical record system. [employer] is an efficient and effective way to communicate your medication request directly to the office and  downloadable as an jackie on your smart phone . [employer] also features a review functionality that allows you to view your medication list as well as leave messages for your physician. Are you ready to get connected? If so please review the attatched instructions or speak to any of our staff to get you set up right away! Thank you so much for your cooperation. Should you have any questions please contact our Practice Administrator. The Physicians and Staff,  [facility]

## 2019-11-12 DIAGNOSIS — F41.8 ANXIETY ABOUT HEALTH: ICD-10-CM

## 2019-11-12 NOTE — TELEPHONE ENCOUNTER
LOV: 09/17/2019  Refill: 06/18/2019  Going for MRI- reason for request   Labs: 09/09/2019  Next Appt: MANUEL

## 2019-11-13 ENCOUNTER — HOSPITAL ENCOUNTER (OUTPATIENT)
Dept: CT IMAGING | Age: 69
Discharge: HOME | End: 2019-11-13
Attending: PSYCHIATRY & NEUROLOGY
Payer: MEDICARE

## 2019-11-13 ENCOUNTER — HOSPITAL ENCOUNTER (OUTPATIENT)
Dept: NON INVASIVE DIAGNOSTICS | Age: 69
Discharge: HOME | End: 2019-11-13
Attending: PSYCHIATRY & NEUROLOGY
Payer: MEDICARE

## 2019-11-13 DIAGNOSIS — G45.9 TIA (TRANSIENT ISCHEMIC ATTACK): ICD-10-CM

## 2019-11-13 LAB
AV PEAK GRADIENT: 112.18 MMHG
AV VELOCITY RATIO: 0.75
ECHO AO ROOT DIAM: 3.21 CM
ECHO AV AREA PEAK VELOCITY: 2.6 CM2
ECHO AV PEAK GRADIENT: 6.5 MMHG
ECHO AV PEAK VELOCITY: 127 CM/S
ECHO AV REGURGITANT PHT: 744 CM
ECHO EST RA PRESSURE: 5 MMHG
ECHO LA AREA 4C: 18.4 CM2
ECHO LA MAJOR AXIS: 4.26 CM
ECHO LA TO AORTIC ROOT RATIO: 1.33
ECHO LA VOL 2C: 51.2 ML (ref 22–52)
ECHO LA VOL 4C: 51.02 ML (ref 22–52)
ECHO LA VOL BP: 55.01 ML (ref 22–52)
ECHO LV E' LATERAL VELOCITY: 8.06 CM/S
ECHO LV E' SEPTAL VELOCITY: 4.09 CM/S
ECHO LV INTERNAL DIMENSION DIASTOLIC: 4.26 CM (ref 3.9–5.3)
ECHO LV INTERNAL DIMENSION SYSTOLIC: 2.87 CM
ECHO LV IVSD: 1 CM (ref 0.6–0.9)
ECHO LV MASS 2D: 150.4 G (ref 67–162)
ECHO LV POSTERIOR WALL DIASTOLIC: 0.92 CM (ref 0.6–0.9)
ECHO LVOT DIAM: 2.09 CM
ECHO LVOT PEAK GRADIENT: 3.6 MMHG
ECHO LVOT PEAK VELOCITY: 94.99 CM/S
ECHO MV A VELOCITY: 77.81 CM/S
ECHO MV AREA PHT: 3.4 CM2
ECHO MV E DECELERATION TIME (DT): 224.3 MS
ECHO MV E VELOCITY: 73.93 CM/S
ECHO MV E/A RATIO: 0.95
ECHO MV E/E' LATERAL: 9.17
ECHO MV E/E' RATIO (AVERAGED): 13.62
ECHO MV E/E' SEPTAL: 18.08
ECHO MV PRESSURE HALF TIME (PHT): 65.1 MS
ECHO MV REGURGITANT PEAK GRADIENT: 100.5 MMHG
ECHO MV REGURGITANT PEAK VELOCITY: 501.26 CM/S
ECHO PULMONARY ARTERY SYSTOLIC PRESSURE (PASP): 37.9 MMHG
ECHO PV MAX VELOCITY: 47.81 CM/S
ECHO PV PEAK GRADIENT: 0.9 MMHG
ECHO RIGHT VENTRICULAR SYSTOLIC PRESSURE (RVSP): 37.9 MMHG
ECHO RV INTERNAL DIMENSION: 3.86 CM
ECHO RV TAPSE: 2.25 CM (ref 1.5–2)
ECHO TV REGURGITANT MAX VELOCITY: 286.62 CM/S
ECHO TV REGURGITANT PEAK GRADIENT: 32.9 MMHG
LVFS 2D: 32.61 %
MV DEC SLOPE: 3.3
PISA AR MAX VEL: 529.58 CM/S
PULMONARY ARTERY END DIASTOLIC PRESSURE: 6.5 MMHG
PULMONARY ARTERY MEAN PRESURE: 17 MMHG
PV END DIASTOLIC VELOCITY: 0.6 MMHG

## 2019-11-13 PROCEDURE — 74011000258 HC RX REV CODE- 258: Performed by: RADIOLOGY

## 2019-11-13 PROCEDURE — 70498 CT ANGIOGRAPHY NECK: CPT

## 2019-11-13 PROCEDURE — 74011636320 HC RX REV CODE- 636/320: Performed by: RADIOLOGY

## 2019-11-13 PROCEDURE — 70496 CT ANGIOGRAPHY HEAD: CPT

## 2019-11-13 PROCEDURE — 96374 THER/PROPH/DIAG INJ IV PUSH: CPT

## 2019-11-13 RX ORDER — ALPRAZOLAM 0.25 MG/1
TABLET ORAL
Qty: 30 TAB | Refills: 0 | Status: SHIPPED | OUTPATIENT
Start: 2019-11-13 | End: 2019-11-25 | Stop reason: ALTCHOICE

## 2019-11-13 RX ORDER — SODIUM CHLORIDE 0.9 % (FLUSH) 0.9 %
10 SYRINGE (ML) INJECTION
Status: COMPLETED | OUTPATIENT
Start: 2019-11-13 | End: 2019-11-13

## 2019-11-13 RX ADMIN — Medication 10 ML: at 10:50

## 2019-11-13 RX ADMIN — IOPAMIDOL 100 ML: 755 INJECTION, SOLUTION INTRAVENOUS at 10:50

## 2019-11-13 RX ADMIN — SODIUM CHLORIDE 100 ML: 900 INJECTION, SOLUTION INTRAVENOUS at 10:50

## 2019-11-14 ENCOUNTER — TELEPHONE (OUTPATIENT)
Dept: NEUROLOGY | Age: 69
End: 2019-11-14

## 2019-11-14 DIAGNOSIS — G45.9 TIA (TRANSIENT ISCHEMIC ATTACK): Primary | ICD-10-CM

## 2019-11-14 RX ORDER — ATORVASTATIN CALCIUM 40 MG/1
40 TABLET, FILM COATED ORAL DAILY
Qty: 30 TAB | Refills: 2 | Status: SHIPPED | OUTPATIENT
Start: 2019-11-14 | End: 2019-11-25 | Stop reason: ALTCHOICE

## 2019-11-14 NOTE — TELEPHONE ENCOUNTER
----- Message from Krysta Murphy sent at 11/14/2019  3:17 PM EST -----  Regarding: Dr. Mitul Pham  Patient return call    Caller's first and last name and relationship (if not the patient):      Best contact number(s): [phone]      Whose call is being returned:nurse      Details to clarify the request: Pt returned nurse call regarding test results.       Krysta Murphy

## 2019-11-14 NOTE — TELEPHONE ENCOUNTER
I informed Melissa Spain that we have received these results. Dr. Bradly Gonsalez, please review at your convenience.  Thanks

## 2019-11-14 NOTE — TELEPHONE ENCOUNTER
Radiology calling to make sure Dr. Janine Godfrey received report of CTA of head and neck.  Please call

## 2019-11-14 NOTE — TELEPHONE ENCOUNTER
Please inform that I have reviewed the CTA.  All major vessels look open.  There is mild blockage in very small distal branches. Please ensure that she is taking aspirin 81 mg daily. I recommend that she switch from Zocor/simvastatin to Lipitor or atorvastatin.  I will send in a new prescription.

## 2019-11-14 NOTE — TELEPHONE ENCOUNTER
I reviewed this information with patient. She expressed understanding. Patient stated she is unable to tolerate Lipitor or Crestor. Please advise.

## 2019-11-20 ENCOUNTER — HOSPITAL ENCOUNTER (OUTPATIENT)
Dept: MRI IMAGING | Age: 69
Discharge: HOME | End: 2019-11-20
Attending: PSYCHIATRY & NEUROLOGY
Payer: MEDICARE

## 2019-11-20 DIAGNOSIS — G45.9 TIA (TRANSIENT ISCHEMIC ATTACK): ICD-10-CM

## 2019-11-20 PROCEDURE — 70551 MRI BRAIN STEM W/O DYE: CPT

## 2019-11-22 NOTE — PROGRESS NOTES
MRI results discussed with patient. Continue aspirin and she has increase Zocor to 40 mg daily. Cannot take Lipitor.

## 2019-11-25 ENCOUNTER — OFFICE VISIT (OUTPATIENT)
Dept: PRIMARY CARE CLINIC | Age: 69
End: 2019-11-25

## 2019-11-25 VITALS
OXYGEN SATURATION: 97 % | HEART RATE: 71 BPM | HEIGHT: 65 IN | WEIGHT: 161 LBS | BODY MASS INDEX: 26.82 KG/M2 | RESPIRATION RATE: 16 BRPM | SYSTOLIC BLOOD PRESSURE: 120 MMHG | TEMPERATURE: 97.8 F | DIASTOLIC BLOOD PRESSURE: 73 MMHG

## 2019-11-25 DIAGNOSIS — E78.5 HYPERLIPIDEMIA, UNSPECIFIED HYPERLIPIDEMIA TYPE: ICD-10-CM

## 2019-11-25 DIAGNOSIS — F41.8 ANXIETY ABOUT HEALTH: ICD-10-CM

## 2019-11-25 DIAGNOSIS — G45.9 TIA (TRANSIENT ISCHEMIC ATTACK): Primary | ICD-10-CM

## 2019-11-25 RX ORDER — CLONAZEPAM 0.5 MG/1
0.5 TABLET ORAL
Qty: 30 TAB | Refills: 0 | Status: SHIPPED | OUTPATIENT
Start: 2019-11-25 | End: 2020-03-16 | Stop reason: ALTCHOICE

## 2019-11-25 RX ORDER — SIMVASTATIN 40 MG/1
40 TABLET, FILM COATED ORAL
COMMUNITY
End: 2019-12-04 | Stop reason: SDUPTHER

## 2019-11-25 NOTE — PROGRESS NOTES
[facility]   [address], [address] [facility]  P: [phone]  F: [phone]      Chief Complaint   Patient presents with    Other     f/u abnormal imaging; US, CT, MRI showed possible hx of mini-stroke per neurologist       Jonas Schaefer is a 71 y.o. female who presents to clinic for Other (f/u abnormal imaging; US, CT, MRI showed possible hx of mini-stroke per neurologist). HPI:  Maryann Gamble is a 69-year-old female who presents today to follow-up after visit with neurology, Dr. Regine Krishnan. I referred her there after visit with me 8/29/2019 after having a 1 day complaint of numbness to the tips of her right thumb and index finger, with mild lip tingling for about 24 hours. She denied any other symptoms at that time, and was possibly relating symptoms to jed and turmeric supplement handling during this time period. After visiting neurology, it was her recommended she go for head CTA head and neck, as well as an MRI brain without contrast.    The patient presents today and states she was told by Dr. Regine Krishnan that her imaging showed a previous stroke, and she is to increase her Zocor to 40 mg daily and take a baby aspirin. She denies any further neurological complaints today, but is requesting medicine for anxiety related to her multiple health problems including non-Hodgkin's lymphoma, in remission and followed by an oncologist.  She is also interested in following up with a cardiologist for repeat stress test, and to discuss further cardiovascular risk reduction. There are no active problems to display for this patient.      Past Medical History:   Diagnosis Date    Cancer ([address]     non hodskins folicular lymphoma     Past Surgical History:   Procedure Laterality Date    BREAST SURGERY PROCEDURE UNLISTED      lump from breast benign    HX GYN      partial hysterectomy     Social History     Socioeconomic History    Marital status:      Spouse name: Not on file    Number of children: Not on file    Years of education: Not on file    Highest education level: Not on file   Occupational History    Not on file   Social Needs    Financial resource strain: Not on file    Food insecurity:     Worry: Not on file     Inability: Not on file    Transportation needs:     Medical: Not on file     Non-medical: Not on file   Tobacco Use    Smoking status: Former Smoker     Last attempt to quit: 3/29/2004     Years since quitting: 15.6    Smokeless tobacco: Never Used   Substance and Sexual Activity    Alcohol use: Yes     Frequency: Never    Drug use: Never    Sexual activity: Not on file   Lifestyle    Physical activity:     Days per week: Not on file     Minutes per session: Not on file    Stress: Not on file   Relationships    Social connections:     Talks on phone: Not on file     Gets together: Not on file     Attends Nondenominational service: Not on file     Active member of club or organization: Not on file     Attends meetings of clubs or organizations: Not on file     Relationship status: Not on file    Intimate partner violence:     Fear of current or ex partner: Not on file     Emotionally abused: Not on file     Physically abused: Not on file     Forced sexual activity: Not on file   Other Topics Concern    Not on file   Social History Narrative    ** Merged History Encounter **          No family history on file. Allergies   Allergen Reactions    Niacin Preparations Rash and Swelling    Other Plant, Animal, Environmental Hives     metals    Niacin Angioedema, Rash and Swelling       Current Outpatient Medications   Medication Sig Dispense Refill    simvastatin (ZOCOR) 40 mg tablet Take 40 mg by mouth nightly.  clonazePAM (KLONOPIN) 0.5 mg tablet Take 1 Tab by mouth nightly as needed (anxiety). Max Daily Amount: 0.5 mg. 30 Tab 0    biotin 100 mg/gram powd Take 10,000 mcg by mouth.  coenzyme q10 (CO Q-10) 10 mg cap Take 10 mg by mouth daily.  aspirin delayed-release 81 mg tablet Take 81 mg by mouth daily.  cholecalciferol, VITAMIN D3, (VITAMIN D3) 5,000 unit tab tablet Take 5,000 Units by mouth daily. Indications: takes 10,000U      cholecalciferol, vitamin D3, (VITAMIN D3) 2,000 unit tab Take  by mouth.  cetirizine (ZYRTEC) 10 mg tablet Take 1 Tab by mouth nightly. 30 Tab 1    fluticasone propionate (FLONASE) 50 mcg/actuation nasal spray Take as directed. 1 Bottle 0    Biotin 2,500 mcg cap Take 2,500 mg by mouth. The medications were reviewed and updated in the medical record. The past medical history, past surgical history, and family history were reviewed and updated in the medical record. REVIEW OF SYSTEMS   Review of Systems   Constitutional: Negative for malaise/fatigue. HENT: Negative for congestion. Eyes: Negative for blurred vision and pain. Respiratory: Negative for cough and shortness of breath. Cardiovascular: Negative for chest pain and palpitations. Gastrointestinal: Negative for abdominal pain and heartburn. Genitourinary: Negative for frequency and urgency. Musculoskeletal: Negative for joint pain and myalgias. Neurological: Negative for dizziness, tingling, sensory change, weakness and headaches. Psychiatric/Behavioral: Negative for depression, memory loss and substance abuse. PHYSICAL EXAM     Visit Vitals  /73   Pulse 71   Temp 97.8 °F (36.6 °C) (Oral)   Resp 16   Ht 5' 5\" (1.651 m)   Wt 161 lb (73 kg)   SpO2 97%   BMI 26.79 kg/m²       Physical Exam  Vitals signs and nursing note reviewed. Constitutional:       Comments: BMI 26.79   HENT:      Head: Normocephalic and atraumatic. Right Ear: External ear normal.      Left Ear: External ear normal.   Cardiovascular:      Rate and Rhythm: Normal rate and regular rhythm. Heart sounds: Normal heart sounds, S1 normal and S2 normal. No murmur. No friction rub. No gallop.     Pulmonary:      Effort: Pulmonary effort is normal.      Breath sounds: Normal breath sounds. Musculoskeletal: Normal range of motion. Skin:     General: Skin is warm and dry. Neurological:      Mental Status: She is alert and oriented to person, place, and time. Gait: Gait is intact. Psychiatric:         Mood and Affect: Affect normal. Mood is anxious. Judgment: Judgment normal.       ASSESSMENT/ PLAN   Diagnoses and all orders for this visit:    1. TIA (transient ischemic attack)       -Reviewed imaging including CT, MRI, and echo per patient request. Agreed with plan to increase Zocor to 40 mg tablet, and she should start a baby aspirin.       -The patient would like to discuss further cardiovascular risk reduction with a cardiologist, which I referred her to today. Discussed lifestyle changes, daily physical activity, and advised 150 minutes of exercise weekly. Discussed healthy diet choices and limiting fried, fatty foods, fast foods, processed foods, sugar-sweetened beverages/soda, and added sugars. Increase fruits, vegetables, low-fat dairy products, lean proteins, and whole grains. 2. Hyperlipidemia, unspecified hyperlipidemia type  -     REFERRAL TO CARDIOLOGY  -Recent increase in Zocor to 40mg  will recheck cholesterol in 3 months. On aspirin. 3. Anxiety about health  -     clonazePAM (KLONOPIN) 0.5 mg tablet; Take 1 Tab by mouth nightly as needed (anxiety). Max Daily Amount: 0.5 mg. Instructed to use sparingly, will need to see psychiatry if requiring every day. Disclaimer:  Advised patient to call back or return to office if symptoms worsen/change/persist.  Discussed expected course/resolution/complications of diagnosis in detail with patient.     Medication risks/benefits/alternatives discussed with patient. Patient was given an after visit summary which includes diagnoses, current medications, & vitals.      Discussed patient instructions and advised to read to all patient instructions regarding care.    Patient expressed understanding with the diagnosis and plan. This note will not be viewable in [address]         Binh Monique NP  11/25/2019        (This document has been electronically signed)

## 2019-11-25 NOTE — PROGRESS NOTES
Identified pt with two pt identifiers(name and ). Reviewed record in preparation for visit and have obtained necessary documentation. Chief Complaint   Patient presents with    Other     f/u abnormal imaging; US, CT, MRI showed possible hx of mini-stroke per neurologist        Health Maintenance Due   Topic    Hepatitis C Screening     DTaP/Tdap/Td series (1 - Tdap)    Shingrix Vaccine Age 50> (1 of 2)    FOBT Q 1 YEAR AGE 54-65     GLAUCOMA SCREENING Q2Y     Bone Densitometry (Dexa) Screening     Pneumococcal 65+ years (1 of 1 - PPSV23)    MEDICARE YEARLY EXAM        Coordination of Care Questionnaire:  :   1) Have you been to an emergency room, urgent care, or hospitalized since your last visit? If yes, where when, and reason for visit? no       2. Have seen or consulted any other health care provider since your last visit? If yes, where when, and reason for visit? YES  - neurology, imaging     Patient is accompanied by spouse I have received verbal consent from Charisse Zaman to discuss any/all medical information while they are present in the room.

## 2019-11-26 DIAGNOSIS — J32.0 MAXILLARY SINUSITIS, UNSPECIFIED CHRONICITY: Primary | ICD-10-CM

## 2019-11-26 RX ORDER — AMOXICILLIN 875 MG/1
875 TABLET, FILM COATED ORAL 2 TIMES DAILY
Qty: 14 TAB | Refills: 0 | Status: SHIPPED | OUTPATIENT
Start: 2019-11-26 | End: 2019-12-03

## 2019-12-04 DIAGNOSIS — E78.5 HYPERLIPIDEMIA, UNSPECIFIED HYPERLIPIDEMIA TYPE: ICD-10-CM

## 2019-12-04 RX ORDER — SIMVASTATIN 40 MG/1
40 TABLET, FILM COATED ORAL
Status: CANCELLED | OUTPATIENT
Start: 2019-12-04

## 2019-12-04 RX ORDER — SIMVASTATIN 20 MG/1
TABLET, FILM COATED ORAL
Qty: 90 TAB | Refills: 1 | OUTPATIENT
Start: 2019-12-04

## 2019-12-04 RX ORDER — SIMVASTATIN 40 MG/1
40 TABLET, FILM COATED ORAL
Qty: 90 TAB | Refills: 1 | Status: SHIPPED | OUTPATIENT
Start: 2019-12-04 | End: 2020-06-02 | Stop reason: SDUPTHER

## 2019-12-09 ENCOUNTER — OFFICE VISIT (OUTPATIENT)
Dept: PRIMARY CARE CLINIC | Age: 69
End: 2019-12-09

## 2019-12-09 VITALS
HEIGHT: 65 IN | RESPIRATION RATE: 17 BRPM | BODY MASS INDEX: 27.42 KG/M2 | DIASTOLIC BLOOD PRESSURE: 82 MMHG | TEMPERATURE: 97.7 F | WEIGHT: 164.6 LBS | SYSTOLIC BLOOD PRESSURE: 131 MMHG | OXYGEN SATURATION: 95 % | HEART RATE: 70 BPM

## 2019-12-09 DIAGNOSIS — L65.9 HAIR LOSS: ICD-10-CM

## 2019-12-09 DIAGNOSIS — E05.90 HYPERTHYROIDISM: Primary | ICD-10-CM

## 2019-12-09 NOTE — PROGRESS NOTES
[facility]   [address], [address] [facility]  P: [phone]  F: [phone]      Chief Complaint   Patient presents with    Other     endocrinology did some blood work and tsh came back elevated. Hong Jha is a 71 y.o. female who presents to clinic for Other (endocrinology did some blood work and tsh came back elevated. ). HPI:    Devin Yang is a 55-year-old female who presents today for recent lab work that indicated her TSH was 0.107, with free T4 of 1.15. She endorses previously having hyperthyroidism, and at one point in time was on methimazole. She endorses chronic hair loss, but denies any symptoms she relates to an overactive thyroid. There are no active problems to display for this patient.      Past Medical History:   Diagnosis Date    [address] ([facility] [address]     non hodskins folicular lymphoma     Past Surgical History:   Procedure Laterality Date    BREAST SURGERY PROCEDURE UNLISTED      lump from breast benign    HX GYN      partial hysterectomy     Social History     Socioeconomic History    Marital status:      Spouse name: Not on file    Number of children: Not on file    Years of education: Not on file    Highest education level: Not on file   Occupational History    Not on file   Social Needs    Financial resource strain: Not on file    Food insecurity:     Worry: Not on file     Inability: Not on file    Transportation needs:     Medical: Not on file     Non-medical: Not on file   Tobacco Use    Smoking status: Former Smoker     Last attempt to quit: 3/29/2004     Years since quitting: 15.7    Smokeless tobacco: Never Used   Substance and Sexual Activity    Alcohol use: Yes     Frequency: Never    Drug use: Never    Sexual activity: Not on file   Lifestyle    Physical activity:     Days per week: Not on file     Minutes per session: Not on file    Stress: Not on file   Relationships    Social connections:     Talks on phone: Not on file     Gets together: Not on file     Attends Confucianism service: Not on file     Active member of club or organization: Not on file     Attends meetings of clubs or organizations: Not on file     Relationship status: Not on file    Intimate partner violence:     Fear of current or ex partner: Not on file     Emotionally abused: Not on file     Physically abused: Not on file     Forced sexual activity: Not on file   Other Topics Concern    Not on file   Social History Narrative    ** Merged History Encounter **          History reviewed. No pertinent family history. Allergies   Allergen Reactions    Niacin Preparations Rash and Swelling    Other Plant, Animal, Environmental Hives     metals    Niacin Angioedema, Rash and Swelling       Current Outpatient Medications   Medication Sig Dispense Refill    simvastatin (ZOCOR) 40 mg tablet Take 1 Tab by mouth nightly. 90 Tab 1    biotin 100 mg/gram powd Take 10,000 mcg by mouth.  cholecalciferol, vitamin D3, (VITAMIN D3) 2,000 unit tab Take  by mouth.  coenzyme q10 (CO Q-10) 10 mg cap Take 10 mg by mouth daily.  Biotin 2,500 mcg cap Take 2,500 mg by mouth.  aspirin delayed-release 81 mg tablet Take 81 mg by mouth daily.  cholecalciferol, VITAMIN D3, (VITAMIN D3) 5,000 unit tab tablet Take 5,000 Units by mouth daily. Indications: takes 10,000U      clonazePAM (KLONOPIN) 0.5 mg tablet Take 1 Tab by mouth nightly as needed (anxiety). Max Daily Amount: 0.5 mg. 30 Tab 0    cetirizine (ZYRTEC) 10 mg tablet Take 1 Tab by mouth nightly. 30 Tab 1    fluticasone propionate (FLONASE) 50 mcg/actuation nasal spray Take as directed. 1 Bottle 0           The medications were reviewed and updated in the medical record. The past medical history, past surgical history, and family history were reviewed and updated in the medical record. REVIEW OF SYSTEMS   Review of Systems   Constitutional: Negative for malaise/fatigue.    HENT: Negative for congestion. Eyes: Negative for blurred vision and pain. Respiratory: Negative for cough and shortness of breath. Cardiovascular: Negative for chest pain and palpitations. Gastrointestinal: Negative for abdominal pain and heartburn. Genitourinary: Negative for frequency and urgency. Musculoskeletal: Negative for joint pain and myalgias. Neurological: Negative for dizziness, tingling, sensory change, weakness and headaches. Psychiatric/Behavioral: Negative for depression, memory loss and substance abuse. PHYSICAL EXAM     Visit Vitals  /82 (BP 1 Location: Left arm, BP Patient Position: Sitting)   Pulse 70   Temp 97.7 °F (36.5 °C)   Resp 17   Ht 5' 5\" (1.651 m)   Wt 164 lb 9.6 oz (74.7 kg)   SpO2 95%   BMI 27.39 kg/m²       Physical Exam  Vitals signs and nursing note reviewed. HENT:      Head: Normocephalic and atraumatic. Right Ear: External ear normal.      Left Ear: External ear normal.   Neck:      Musculoskeletal: Normal range of motion and neck supple. Thyroid: No thyroid mass, thyromegaly or thyroid tenderness. Cardiovascular:      Rate and Rhythm: Normal rate and regular rhythm. Heart sounds: Normal heart sounds. Pulmonary:      Effort: Pulmonary effort is normal.      Breath sounds: Normal breath sounds. Musculoskeletal: Normal range of motion. Skin:     General: Skin is warm and dry. Neurological:      Mental Status: She is alert and oriented to person, place, and time. Gait: Gait is intact. Psychiatric:         Mood and Affect: Affect normal.         Judgment: Judgment normal.       ASSESSMENT/ PLAN   Diagnoses and all orders for this visit:    1. Hyperthyroidism      - RTO in 6 to 8 weeks for recheck of TSH and free T4.    2. Hair loss     -Encouraged OTC minoxidil. The patient has had a hair transplant previously.     Disclaimer:  Advised patient to call back or return to office if symptoms worsen/change/persist.  Discussed expected course/resolution/complications of diagnosis in detail with patient.     Medication risks/benefits/alternatives discussed with patient. Patient was given an after visit summary which includes diagnoses, current medications, & vitals.      Discussed patient instructions and advised to read to all patient instructions regarding care.      Patient expressed understanding with the diagnosis and plan. This note will not be viewable in [address]         Misty Borjas NP  12/9/2019        (This document has been electronically signed)

## 2019-12-09 NOTE — PROGRESS NOTES
Chief Complaint   Patient presents with    Other     endocrinology did some blood work and tsh came back elevated.

## 2019-12-31 NOTE — TELEPHONE ENCOUNTER
Spoke to [facility] at [address]. [facility] stated that medication Femring . 05mg/24 hr releases every 24 hours, but ring only needs to be changed every 3 months.

## 2019-12-31 NOTE — TELEPHONE ENCOUNTER
Script Clarification      Femring 0.05mg/ 24  Insert 1 RING VAGINALLY  DAILY     \" Note to prescriber: Change ring Daily? ?? Not normal dosing. \"

## 2020-01-06 ENCOUNTER — TELEPHONE (OUTPATIENT)
Dept: PRIMARY CARE CLINIC | Age: 70
End: 2020-01-06

## 2020-01-23 LAB — MAMMOGRAPHY, EXTERNAL: NORMAL

## 2020-03-16 DIAGNOSIS — F41.8 ANXIETY ABOUT HEALTH: ICD-10-CM

## 2020-03-16 RX ORDER — ALPRAZOLAM 0.25 MG/1
TABLET ORAL
Qty: 30 TAB | Refills: 1 | Status: SHIPPED | OUTPATIENT
Start: 2020-03-16 | End: 2020-05-15 | Stop reason: SDUPTHER

## 2020-05-15 ENCOUNTER — VIRTUAL VISIT (OUTPATIENT)
Dept: PRIMARY CARE CLINIC | Age: 70
End: 2020-05-15

## 2020-05-15 DIAGNOSIS — F41.8 ANXIETY ABOUT HEALTH: ICD-10-CM

## 2020-05-15 DIAGNOSIS — K57.92 DIVERTICULITIS: Primary | ICD-10-CM

## 2020-05-15 DIAGNOSIS — R11.0 NAUSEA: ICD-10-CM

## 2020-05-15 RX ORDER — CIPROFLOXACIN 500 MG/1
500 TABLET ORAL 2 TIMES DAILY
Qty: 20 TAB | Refills: 0 | Status: SHIPPED | OUTPATIENT
Start: 2020-05-15 | End: 2020-05-25

## 2020-05-15 RX ORDER — ONDANSETRON 4 MG/1
4 TABLET, ORALLY DISINTEGRATING ORAL
Qty: 15 TAB | Refills: 0 | Status: SHIPPED | OUTPATIENT
Start: 2020-05-15 | End: 2021-07-27 | Stop reason: ALTCHOICE

## 2020-05-15 RX ORDER — ALPRAZOLAM 0.25 MG/1
TABLET ORAL
Qty: 30 TAB | Refills: 0 | Status: SHIPPED | OUTPATIENT
Start: 2020-05-15 | End: 2020-11-06 | Stop reason: SDUPTHER

## 2020-05-15 RX ORDER — METRONIDAZOLE 500 MG/1
500 TABLET ORAL 3 TIMES DAILY
Qty: 30 TAB | Refills: 0 | Status: SHIPPED | OUTPATIENT
Start: 2020-05-15 | End: 2020-05-25

## 2020-05-15 NOTE — PROGRESS NOTES
[facility]   [address], [address] [facility]  P: [phone]  F: [phone]    SUBJECTIVE   Allison Muller is a 71 y.o. female who is seen over telehealth for Abdominal Pain. She reports a prior history of diverticulosis, and feels that this pain is similar to her prior episodes. She endorses left lower quadrant pain, associated with mild nausea. She states her last bowel movement was yesterday and was normal size and consistency. She denies any fevers or vomiting, constipation, or diarrhea. She states she has minimal appetite, but is tolerating solid and liquids at this point time. There are no active problems to display for this patient.     Past Medical History:   Diagnosis Date    Cancer ([address]     non hodskins folicular lymphoma     Past Surgical History:   Procedure Laterality Date    BREAST SURGERY PROCEDURE UNLISTED      lump from breast benign    HX GYN      partial hysterectomy     Social History     Socioeconomic History    Marital status:      Spouse name: Not on file    Number of children: Not on file    Years of education: Not on file    Highest education level: Not on file   Occupational History    Not on file   Social Needs    Financial resource strain: Not on file    Food insecurity     Worry: Not on file     Inability: Not on file    Transportation needs     Medical: Not on file     Non-medical: Not on file   Tobacco Use    Smoking status: Former Smoker     Last attempt to quit: 3/29/2004     Years since quittin.1    Smokeless tobacco: Never Used   Substance and Sexual Activity    Alcohol use: Yes     Frequency: Never    Drug use: Never    Sexual activity: Not on file   Lifestyle    Physical activity     Days per week: Not on file     Minutes per session: Not on file    Stress: Not on file   Relationships    Social connections     Talks on phone: Not on file     Gets together: Not on file     Attends Mandaen service: Not on file     Active member of club or organization: Not on file     Attends meetings of clubs or organizations: Not on file     Relationship status: Not on file    Intimate partner violence     Fear of current or ex partner: Not on file     Emotionally abused: Not on file     Physically abused: Not on file     Forced sexual activity: Not on file   Other Topics Concern    Not on file   Social History Narrative    ** Merged History Encounter **          No family history on file. Allergies   Allergen Reactions    Niacin Preparations Rash and Swelling    Other Plant, Animal, Environmental Hives     metals    Niacin Angioedema, Rash and Swelling       Current Outpatient Medications   Medication Sig Dispense Refill    ciprofloxacin HCl (CIPRO) 500 mg tablet Take 1 Tab by mouth two (2) times a day for 10 days. 20 Tab 0    metroNIDAZOLE (FLAGYL) 500 mg tablet Take 1 Tab by mouth three (3) times daily for 10 days. 30 Tab 0    ALPRAZolam (XANAX) 0.25 mg tablet alprazolam 0.25 mg tablet  TAKE 1 TABLET BY MOUTH TWICE A DAY AS NEEDED 30 Tab 1    estradiol acetate 0.05 mg/24 hr ring Insert 1 ring vaginally q 3 months. 7 Each 0    simvastatin (ZOCOR) 40 mg tablet Take 1 Tab by mouth nightly. 90 Tab 1    biotin 100 mg/gram powd Take 10,000 mcg by mouth.  cholecalciferol, vitamin D3, (VITAMIN D3) 2,000 unit tab Take  by mouth.  coenzyme q10 (CO Q-10) 10 mg cap Take 10 mg by mouth daily.  cetirizine (ZYRTEC) 10 mg tablet Take 1 Tab by mouth nightly. 30 Tab 1    fluticasone propionate (FLONASE) 50 mcg/actuation nasal spray Take as directed. 1 Bottle 0    Biotin 2,500 mcg cap Take 2,500 mg by mouth.  aspirin delayed-release 81 mg tablet Take 81 mg by mouth daily.  cholecalciferol, VITAMIN D3, (VITAMIN D3) 5,000 unit tab tablet Take 5,000 Units by mouth daily. Indications: takes 10,000U             The medications were reviewed and updated in the medical record.   The past medical history, past surgical history, and family history were reviewed and updated in the medical record. REVIEW OF SYSTEMS   Review of Systems   Constitutional: Negative for fever and malaise/fatigue. HENT: Negative for congestion. Eyes: Negative for blurred vision and pain. Respiratory: Negative for cough and shortness of breath. Cardiovascular: Negative for chest pain and palpitations. Gastrointestinal: Positive for abdominal pain and nausea. Negative for heartburn. Genitourinary: Negative for frequency and urgency. Musculoskeletal: Negative for joint pain and myalgias. Neurological: Negative for dizziness, tingling, sensory change, weakness and headaches. Psychiatric/Behavioral: Negative for depression, memory loss and substance abuse. PHYSICAL EXAM   NO VITALS WERE TAKEN FOR THIS VISIT  Physical Exam  Constitutional:       Appearance: Normal appearance. HENT:      Head: Normocephalic and atraumatic. Right Ear: External ear normal.      Left Ear: External ear normal.   Eyes:      Conjunctiva/sclera: Conjunctivae normal.   Pulmonary:      Effort: Pulmonary effort is normal.   Neurological:      Mental Status: She is alert and oriented to person, place, and time. Mental status is at baseline. Psychiatric:         Speech: Speech normal.         Behavior: Behavior normal. Behavior is cooperative. Thought Content: Thought content normal.       ASSESSMENT/ PLAN   Diagnoses and all orders for this visit:    1. Diverticulitis  -     ciprofloxacin HCl (CIPRO) 500 mg tablet; Take 1 Tab by mouth two (2) times a day for 10 days. -     metroNIDAZOLE (FLAGYL) 500 mg tablet; Take 1 Tab by mouth three (3) times daily for 10 days. -     ondansetron (ZOFRAN ODT) 4 mg disintegrating tablet; Take 1 Tab by mouth every eight (8) hours as needed for Nausea or Vomiting.  - We discussed the above antibiotics, increase hydration, and to avoid foods with sediment.   If she is experiencing any worsening pain or fevers, we discussed she will need to present to the ER over the weekend for evaluation/ imaging. Clinically defer imaging at this time with COVID pandemic and possible exposure risk. Follow up with GI.     2. Anxiety about health  -     ALPRAZolam (XANAX) 0.25 mg tablet; alprazolam 0.25 mg tablet  TAKE 1 TABLET BY MOUTH TWICE A DAY AS NEEDED    3. Nausea  -     ondansetron (ZOFRAN ODT) 4 mg disintegrating tablet; Take 1 Tab by mouth every eight (8) hours as needed for Nausea or Vomiting. I was in the office while conducting this encounter. Consent:  She and/or her healthcare decision maker is aware that this patient-initiated Telehealth encounter is a billable service, with coverage as determined by her insurance carrier. She is aware that she may receive a bill and has provided verbal consent to proceed: Yes    This virtual visit was conducted via [address] Pursuant to the emergency declaration under the [address] [place] [address], [facility] waiver authority and the [employer] Act, this Virtual  Visit was conducted to reduce the patient's risk of exposure to COVID-19 and provide continuity of care for an established patient. Services were provided through a video synchronous discussion virtually to substitute for in-person clinic visit. Due to this being a TeleHealth evaluation, many elements of the physical examination are unable to be assessed. Total Time: minutes: 11-20 minutes. Disclaimer:  Advised patient to call back or return to office if symptoms worsen/change/persist.  Discussed expected course/resolution/complications of diagnosis in detail with patient. Medication risks/benefits/alternatives discussed with patient. Patient was given an after visit summary which includes diagnoses, current medications, & vitals. Discussed patient instructions and advised to read to all patient instructions regarding care. Patient expressed understanding with the diagnosis and plan. This note will not be viewable in [address]         Lottie Miramontes NP  5/15/2020        (This document has been electronically signed)

## 2020-05-18 ENCOUNTER — TELEPHONE (OUTPATIENT)
Dept: PRIMARY CARE CLINIC | Age: 70
End: 2020-05-18

## 2020-05-18 NOTE — TELEPHONE ENCOUNTER
Patient karynt had a bowl movement in 3 days, would like to know if its is okay for her to do an anima.

## 2020-06-02 DIAGNOSIS — E78.5 HYPERLIPIDEMIA, UNSPECIFIED HYPERLIPIDEMIA TYPE: Primary | ICD-10-CM

## 2020-06-03 RX ORDER — SIMVASTATIN 40 MG/1
40 TABLET, FILM COATED ORAL
Qty: 90 TAB | Refills: 1 | Status: SHIPPED | OUTPATIENT
Start: 2020-06-03 | End: 2020-11-23

## 2020-07-01 DIAGNOSIS — E55.9 VITAMIN D DEFICIENCY: ICD-10-CM

## 2020-07-01 DIAGNOSIS — C82.90 FOLLICULAR LYMPHOMA, UNSPECIFIED FOLLICULAR LYMPHOMA TYPE, UNSPECIFIED BODY REGION (HCC): Primary | ICD-10-CM

## 2020-07-01 DIAGNOSIS — E05.90 HYPERTHYROIDISM: ICD-10-CM

## 2020-07-03 LAB
25(OH)D3+25(OH)D2 SERPL-MCNC: 80.4 NG/ML (ref 30–100)
ALBUMIN SERPL-MCNC: 4.4 G/DL (ref 3.8–4.8)
ALBUMIN/GLOB SERPL: 2.4 {RATIO} (ref 1.2–2.2)
ALP SERPL-CCNC: 76 IU/L (ref 39–117)
ALT SERPL-CCNC: 18 IU/L (ref 0–32)
AST SERPL-CCNC: 22 IU/L (ref 0–40)
BASOPHILS # BLD AUTO: 0.1 X10E3/UL (ref 0–0.2)
BASOPHILS NFR BLD AUTO: 1 %
BILIRUB SERPL-MCNC: 0.5 MG/DL (ref 0–1.2)
BUN SERPL-MCNC: 15 MG/DL (ref 8–27)
BUN/CREAT SERPL: 21 (ref 12–28)
CALCIUM SERPL-MCNC: 9.6 MG/DL (ref 8.7–10.3)
CHLORIDE SERPL-SCNC: 103 MMOL/L (ref 96–106)
CO2 SERPL-SCNC: 23 MMOL/L (ref 20–29)
CREAT SERPL-MCNC: 0.73 MG/DL (ref 0.57–1)
EOSINOPHIL # BLD AUTO: 0.2 X10E3/UL (ref 0–0.4)
EOSINOPHIL NFR BLD AUTO: 3 %
ERYTHROCYTE [DISTWIDTH] IN BLOOD BY AUTOMATED COUNT: 13.3 % (ref 11.7–15.4)
GLOBULIN SER CALC-MCNC: 1.8 G/DL (ref 1.5–4.5)
GLUCOSE SERPL-MCNC: 98 MG/DL (ref 65–99)
HCT VFR BLD AUTO: 40.8 % (ref 34–46.6)
HGB BLD-MCNC: 13.6 G/DL (ref 11.1–15.9)
IMM GRANULOCYTES # BLD AUTO: 0 X10E3/UL (ref 0–0.1)
IMM GRANULOCYTES NFR BLD AUTO: 0 %
LYMPHOCYTES # BLD AUTO: 2.3 X10E3/UL (ref 0.7–3.1)
LYMPHOCYTES NFR BLD AUTO: 31 %
MCH RBC QN AUTO: 30 PG (ref 26.6–33)
MCHC RBC AUTO-ENTMCNC: 33.3 G/DL (ref 31.5–35.7)
MCV RBC AUTO: 90 FL (ref 79–97)
MONOCYTES # BLD AUTO: 0.7 X10E3/UL (ref 0.1–0.9)
MONOCYTES NFR BLD AUTO: 9 %
NEUTROPHILS # BLD AUTO: 4.1 X10E3/UL (ref 1.4–7)
NEUTROPHILS NFR BLD AUTO: 56 %
PLATELET # BLD AUTO: 278 X10E3/UL (ref 150–450)
POTASSIUM SERPL-SCNC: 4.8 MMOL/L (ref 3.5–5.2)
PROT SERPL-MCNC: 6.2 G/DL (ref 6–8.5)
RBC # BLD AUTO: 4.53 X10E6/UL (ref 3.77–5.28)
SODIUM SERPL-SCNC: 140 MMOL/L (ref 134–144)
T4 FREE SERPL-MCNC: 1.48 NG/DL (ref 0.82–1.77)
TSH SERPL DL<=0.005 MIU/L-ACNC: 1.75 UIU/ML (ref 0.45–4.5)
WBC # BLD AUTO: 7.4 X10E3/UL (ref 3.4–10.8)

## 2020-10-10 ENCOUNTER — OFFICE VISIT (OUTPATIENT)
Dept: URGENT CARE | Age: 70
End: 2020-10-10
Payer: MEDICARE

## 2020-10-10 VITALS — TEMPERATURE: 97.8 F | HEART RATE: 65 BPM | OXYGEN SATURATION: 95 % | RESPIRATION RATE: 15 BRPM

## 2020-10-10 DIAGNOSIS — Z20.822 ENCOUNTER FOR LABORATORY TESTING FOR COVID-19 VIRUS: Primary | ICD-10-CM

## 2020-10-10 DIAGNOSIS — R51.9 ACUTE NONINTRACTABLE HEADACHE, UNSPECIFIED HEADACHE TYPE: ICD-10-CM

## 2020-10-10 DIAGNOSIS — R05.9 COUGH: ICD-10-CM

## 2020-10-10 PROCEDURE — 99213 OFFICE O/P EST LOW 20 MIN: CPT | Performed by: EMERGENCY MEDICINE

## 2020-10-10 PROCEDURE — 3017F COLORECTAL CA SCREEN DOC REV: CPT | Performed by: EMERGENCY MEDICINE

## 2020-10-10 PROCEDURE — 1101F PT FALLS ASSESS-DOCD LE1/YR: CPT | Performed by: EMERGENCY MEDICINE

## 2020-10-10 PROCEDURE — G8427 DOCREV CUR MEDS BY ELIG CLIN: HCPCS | Performed by: EMERGENCY MEDICINE

## 2020-10-10 PROCEDURE — G9899 SCRN MAM PERF RSLTS DOC: HCPCS | Performed by: EMERGENCY MEDICINE

## 2020-10-10 PROCEDURE — 1090F PRES/ABSN URINE INCON ASSESS: CPT | Performed by: EMERGENCY MEDICINE

## 2020-10-10 PROCEDURE — G8419 CALC BMI OUT NRM PARAM NOF/U: HCPCS | Performed by: EMERGENCY MEDICINE

## 2020-10-10 PROCEDURE — G8536 NO DOC ELDER MAL SCRN: HCPCS | Performed by: EMERGENCY MEDICINE

## 2020-10-10 PROCEDURE — G8400 PT W/DXA NO RESULTS DOC: HCPCS | Performed by: EMERGENCY MEDICINE

## 2020-10-10 PROCEDURE — G8432 DEP SCR NOT DOC, RNG: HCPCS | Performed by: EMERGENCY MEDICINE

## 2020-10-10 NOTE — PROGRESS NOTES
Pt here for COVID test for URI sx for 3-4 days and slight cough this AM. She got her flu test 3 weeks ago and declined a flu test here and she was offered a strep test but stated she only had a little tickle in her throat and has no sore throat now and doesn't want a test. She denies known COVID exposure. She just wants a COVID test. She has all mild sx of myalgias and some frontal HA, sinus congestion and mild drainage/PND with a slight cough since this AM. She denies SOB and said she answered \"yes\" to the screening question because she felt a bit congested and mild increase in effort of breathing as she was climbing steps earlier. She denies any dyspnea, CP, weakness, wheezing, or chest tightness. She denies feeling short or breath or like she cannot catch her breath. Cough is intermittent and a tickle and NP. This patient was seen in [facility] Clinic at [address] [place] [address] Urgent Care while outdoors at their vehicle due to COVID-19 pandemic with PPE and focused examination in order to decrease community viral transmission. Pt recently had blood work (July) that was good and not currently getting treatment for her lymphoma      The history is provided by the patient. Sore Throat    The history is provided by the patient. This is a new problem. The current episode started more than 2 days ago. The problem has been gradually worsening. There has been no fever. Associated symptoms include congestion, headaches (some-feels like sinus pressure, Comes and goes, mild), shortness of breath (See above-slight increase in work of breathing as climbing steps earlier. resolved and none since ) and cough. Pertinent negatives include no diarrhea, no vomiting, no ear pain, no stridor, no swollen glands, no trouble swallowing and no stiff neck.         Past Medical History:   Diagnosis Date    Cancer [facility])     non hodskins folicular lymphoma        Past Surgical History:   Procedure Laterality Date    BREAST SURGERY PROCEDURE UNLISTED      lump from breast benign    HX GYN      partial hysterectomy         History reviewed. No pertinent family history. Social History     Socioeconomic History    Marital status:      Spouse name: Not on file    Number of children: Not on file    Years of education: Not on file    Highest education level: Not on file   Occupational History    Not on file   Social Needs    Financial resource strain: Not on file    Food insecurity     Worry: Not on file     Inability: Not on file    Transportation needs     Medical: Not on file     Non-medical: Not on file   Tobacco Use    Smoking status: Former Smoker     Last attempt to quit: 3/29/2004     Years since quittin.5    Smokeless tobacco: Never Used   Substance and Sexual Activity    Alcohol use: Yes     Frequency: Never    Drug use: Never    Sexual activity: Not on file   Lifestyle    Physical activity     Days per week: Not on file     Minutes per session: Not on file    Stress: Not on file   Relationships    Social connections     Talks on phone: Not on file     Gets together: Not on file     Attends Rastafari service: Not on file     Active member of club or organization: Not on file     Attends meetings of clubs or organizations: Not on file     Relationship status: Not on file    Intimate partner violence     Fear of current or ex partner: Not on file     Emotionally abused: Not on file     Physically abused: Not on file     Forced sexual activity: Not on file   Other Topics Concern    Not on file   Social History Narrative    ** Merged History Encounter **                     ALLERGIES: Niacin preparations; Other plant, animal, environmental; and Niacin    Review of Systems   Constitutional: Negative for chills, diaphoresis, fatigue and fever. HENT: Positive for congestion, postnasal drip, rhinorrhea, sinus pressure and sore throat (more a tickle earlier and none at present).  Negative for ear pain, sinus pain, sneezing and trouble swallowing. No change in sense of taste or smell     Eyes: Negative for redness. Respiratory: Positive for cough and shortness of breath (See above-slight increase in work of breathing as climbing steps earlier. resolved and none since ). Negative for stridor. Cardiovascular: Negative for chest pain. Gastrointestinal: Negative for abdominal pain, diarrhea, nausea and vomiting. Musculoskeletal: Positive for myalgias (mild). Negative for arthralgias, neck pain and neck stiffness. Skin: Negative for rash. Neurological: Positive for headaches (some-feels like sinus pressure, Comes and goes, mild). Negative for dizziness and weakness. Vitals:    10/10/20 1413   Pulse: 65   Resp: 15   Temp: 97.8 °F (36.6 °C)   SpO2: 95%       Physical Exam  Vitals signs and nursing note reviewed. Constitutional:       General: She is not in acute distress. Appearance: Normal appearance. She is normal weight. She is not ill-appearing, toxic-appearing or diaphoretic. Comments: Pt examined in a vehicle  Well appearing and cooperative, nontoxic, smiling with mild congested sound to her voice    HENT:      Head:      Jaw: No trismus, tenderness or swelling. Nose: Congestion (nasal ) present. No rhinorrhea. Right Sinus: No maxillary sinus tenderness or frontal sinus tenderness. Left Sinus: No maxillary sinus tenderness or frontal sinus tenderness. Mouth/Throat:      Mouth: Mucous membranes are moist.      Pharynx: Oropharynx is clear. Uvula midline. No pharyngeal swelling, oropharyngeal exudate, posterior oropharyngeal erythema or uvula swelling. Comments: Voice clear and managing secretions well. Clear PND   Eyes:      Conjunctiva/sclera: Conjunctivae normal.   Neck:      Musculoskeletal: Normal range of motion and neck supple. No neck rigidity or muscular tenderness. Cardiovascular:      Rate and Rhythm: Normal rate and regular rhythm.       Heart sounds: Normal heart sounds. Pulmonary:      Effort: Pulmonary effort is normal. No respiratory distress. Breath sounds: Normal breath sounds. No stridor. No wheezing, rhonchi or rales. Comments: No cough during exam. No focal abnormal findings on pulmonary exam. No retractions. Conversational without dyspnea  Abdominal:      General: Bowel sounds are normal.      Palpations: Abdomen is soft. Tenderness: There is no abdominal tenderness. There is no guarding. Lymphadenopathy:      Cervical: No cervical adenopathy. Skin:     General: Skin is warm and dry. Capillary Refill: Capillary refill takes less than 2 seconds. Findings: No rash. Comments: Pt examined in vehicle, limited exposure but what is seen is negative for rashes      Neurological:      Mental Status: She is alert and oriented to person, place, and time. Comments: Coordinated movement noted on exam with clear speech. No appreciable deficits appreciated during exam while pt is in vehicle     Psychiatric:         Mood and Affect: Mood normal.         Behavior: Behavior normal.         MDM    ICD-10-CM ICD-9-CM    1. Encounter for laboratory testing for COVID-19 virus  Z20.828 V01.79 NOVEL CORONAVIRUS (COVID-19)   2. Cough  R05 786.2 NOVEL CORONAVIRUS (COVID-19)   3. Acute nonintractable headache, unspecified headache type  R51.9 784.0      No orders of the defined types were placed in this encounter. The patient's condition and possible alternative diagnoses were discussed with the patient and they verbalized understanding. The patient is to follow up with their primary care doctor for continued care. If signs and symptoms persist or become worse or new symptoms develop, the pt is to go immediately to the emergency department. Any new medications that may have been written for should be taken as directed but should always be discussed with the primary care physician and pharmacist. This was communicated to the patient.              Pt instructed to quarantine until COVID testing results are back and then duration of quarantine will depend on result, current recommendations and symptoms. The patient is to get immediate re-evaluation for any new or worsening symptoms. They are to quarantine from other household members. It was recommended they stay hydrated and practice deep breathing exercises.          Procedures

## 2020-10-12 LAB
SARS-COV-2, NAA: NOT DETECTED
SPECIMEN STATUS REPORT, ROLRST: NORMAL

## 2020-11-06 ENCOUNTER — OFFICE VISIT (OUTPATIENT)
Dept: PRIMARY CARE CLINIC | Age: 70
End: 2020-11-06
Payer: MEDICARE

## 2020-11-06 VITALS
HEART RATE: 74 BPM | RESPIRATION RATE: 16 BRPM | HEIGHT: 65 IN | DIASTOLIC BLOOD PRESSURE: 79 MMHG | SYSTOLIC BLOOD PRESSURE: 124 MMHG | BODY MASS INDEX: 27.39 KG/M2 | OXYGEN SATURATION: 97 % | TEMPERATURE: 98.2 F

## 2020-11-06 DIAGNOSIS — F41.8 ANXIETY ABOUT HEALTH: ICD-10-CM

## 2020-11-06 DIAGNOSIS — E78.5 HYPERLIPIDEMIA, UNSPECIFIED HYPERLIPIDEMIA TYPE: Primary | ICD-10-CM

## 2020-11-06 DIAGNOSIS — M81.0 POSTMENOPAUSAL BONE LOSS: ICD-10-CM

## 2020-11-06 DIAGNOSIS — Z23 ENCOUNTER FOR IMMUNIZATION: ICD-10-CM

## 2020-11-06 DIAGNOSIS — Z11.59 NEED FOR HEPATITIS C SCREENING TEST: ICD-10-CM

## 2020-11-06 PROCEDURE — 90732 PPSV23 VACC 2 YRS+ SUBQ/IM: CPT

## 2020-11-06 PROCEDURE — G8427 DOCREV CUR MEDS BY ELIG CLIN: HCPCS | Performed by: NURSE PRACTITIONER

## 2020-11-06 PROCEDURE — 99214 OFFICE O/P EST MOD 30 MIN: CPT | Performed by: NURSE PRACTITIONER

## 2020-11-06 PROCEDURE — G8432 DEP SCR NOT DOC, RNG: HCPCS | Performed by: NURSE PRACTITIONER

## 2020-11-06 PROCEDURE — G9899 SCRN MAM PERF RSLTS DOC: HCPCS | Performed by: NURSE PRACTITIONER

## 2020-11-06 PROCEDURE — 3017F COLORECTAL CA SCREEN DOC REV: CPT | Performed by: NURSE PRACTITIONER

## 2020-11-06 PROCEDURE — G8536 NO DOC ELDER MAL SCRN: HCPCS | Performed by: NURSE PRACTITIONER

## 2020-11-06 PROCEDURE — G8419 CALC BMI OUT NRM PARAM NOF/U: HCPCS | Performed by: NURSE PRACTITIONER

## 2020-11-06 PROCEDURE — G0009 ADMIN PNEUMOCOCCAL VACCINE: HCPCS

## 2020-11-06 PROCEDURE — 1090F PRES/ABSN URINE INCON ASSESS: CPT | Performed by: NURSE PRACTITIONER

## 2020-11-06 PROCEDURE — 1101F PT FALLS ASSESS-DOCD LE1/YR: CPT | Performed by: NURSE PRACTITIONER

## 2020-11-06 RX ORDER — ALPRAZOLAM 0.25 MG/1
TABLET ORAL
Qty: 30 TAB | Refills: 0 | Status: SHIPPED | OUTPATIENT
Start: 2020-11-06 | End: 2021-02-19 | Stop reason: SDUPTHER

## 2020-11-06 NOTE — PROGRESS NOTES
Room: 9      Identified pt with two pt identifiers(name and ). Reviewed record in preparation for visit and have obtained necessary documentation. All patient medications has been reviewed. Chief Complaint   Patient presents with    Cholesterol Problem     Pt fasting for blood work     Immunization/Injection     Pneumonia vaccine        Health Maintenance Due   Topic    Hepatitis C Screening     DTaP/Tdap/Td series (1 - Tdap)    Shingrix Vaccine Age 50> (1 of 2)    GLAUCOMA SCREENING Q2Y     Bone Densitometry (Dexa) Screening     Pneumococcal 65+ years (1 of 1 - PPSV23)    Pneumococcal 65+ yrs at Risk Vaccine (1 of 2 - PCV13)    Medicare Yearly Exam     Lipid Screen     Breast Cancer Screen Mammogram       Shingrix: not completed     Dexa: not completed     Pneumonia: accepted     Eye exam; 1 mo ago with ( Leonel Ramirez) Sunita Lehman     Vitals:    20 0949   BP: 124/79   Pulse: 74   Resp: 16   Temp: 98.2 °F (36.8 °C)   TempSrc: Oral   SpO2: 97%   Height: 5' 5\" (1.651 m)   PainSc:   0 - No pain       4. Have you been to the ER, urgent care clinic since your last visit? Hospitalized since your last visit? No    5. Have you seen or consulted any other health care providers outside of the [address] [place] [address] since your last visit? Include any pap smears or colon screening. No    6. Would you like to receive your flu shot today? NO      Patient is accompanied by spouse I have received verbal consent from Juliana Kwok to discuss any/all medical information while they are present in the room.

## 2020-11-06 NOTE — PROGRESS NOTES
[facility]   [address] [facility], [address] [place] [address]  P: [phone]  F: [phone]      KEVIN Maddox is a 79 y.o. female who presents to clinic for Cholesterol Problem (Pt fasting for blood work ) and Immunization/Injection (Pneumonia vaccine ). Additionally, she is requesting a refill for Xanax which she takes as needed for anxiety. She is also overdue for a DEXA scan. There are no active problems to display for this patient. Past Medical History:   Diagnosis Date    Cancer ([facility] [address]     non hodskins folicular lymphoma     Past Surgical History:   Procedure Laterality Date    BREAST SURGERY PROCEDURE UNLISTED      lump from breast benign    HX GYN      partial hysterectomy     Social History     Socioeconomic History    Marital status:      Spouse name: Not on file    Number of children: Not on file    Years of education: Not on file    Highest education level: Not on file   Occupational History    Not on file   Social Needs    Financial resource strain: Not on file    Food insecurity     Worry: Not on file     Inability: Not on file    Transportation needs     Medical: Not on file     Non-medical: Not on file   Tobacco Use    Smoking status: Former Smoker     Last attempt to quit: 3/29/2004     Years since quittin.6    Smokeless tobacco: Never Used   Substance and Sexual Activity    Alcohol use:  Yes     Alcohol/week: 3.0 standard drinks     Types: 3 Glasses of wine per week     Frequency: Never    Drug use: Never    Sexual activity: Not on file   Lifestyle    Physical activity     Days per week: Not on file     Minutes per session: Not on file    Stress: Not on file   Relationships    Social connections     Talks on phone: Not on file     Gets together: Not on file     Attends Latter day service: Not on file     Active member of club or organization: Not on file     Attends meetings of clubs or organizations: Not on file     Relationship status: Not on file    Intimate partner violence     Fear of current or ex partner: Not on file     Emotionally abused: Not on file     Physically abused: Not on file     Forced sexual activity: Not on file   Other Topics Concern    Not on file   Social History Narrative    ** Merged History Encounter **          History reviewed. No pertinent family history. Allergies   Allergen Reactions    Niacin Preparations Rash and Swelling    Other Plant, Animal, Environmental Hives     metals    Niacin Angioedema, Rash and Swelling       Current Outpatient Medications   Medication Sig Dispense Refill    ALPRAZolam (XANAX) 0.25 mg tablet alprazolam 0.25 mg tablet  TAKE 1 TABLET BY MOUTH TWICE A DAY AS NEEDED 30 Tab 0    simvastatin (ZOCOR) 40 mg tablet Take 1 Tab by mouth nightly. 90 Tab 1    estradiol acetate 0.05 mg/24 hr ring Insert 1 ring vaginally q 3 months. 7 Each 0    biotin 100 mg/gram powd Take 10,000 mcg by mouth.  cholecalciferol, vitamin D3, (VITAMIN D3) 2,000 unit tab Take  by mouth.  coenzyme q10 (CO Q-10) 10 mg cap Take 10 mg by mouth daily.  aspirin delayed-release 81 mg tablet Take 81 mg by mouth daily.  ondansetron (ZOFRAN ODT) 4 mg disintegrating tablet Take 1 Tab by mouth every eight (8) hours as needed for Nausea or Vomiting. 15 Tab 0    cetirizine (ZYRTEC) 10 mg tablet Take 1 Tab by mouth nightly. 30 Tab 1    fluticasone propionate (FLONASE) 50 mcg/actuation nasal spray Take as directed. 1 Bottle 0    Biotin 2,500 mcg cap Take 2,500 mg by mouth.  cholecalciferol, VITAMIN D3, (VITAMIN D3) 5,000 unit tab tablet Take 5,000 Units by mouth daily. Indications: takes 10,000U             The medications were reviewed and updated in the medical record. The past medical history, past surgical history, and family history were reviewed and updated in the medical record. REVIEW OF SYSTEMS   Review of Systems   Constitutional: Negative for fever and malaise/fatigue.    HENT: Negative for congestion. Eyes: Negative for blurred vision and pain. Respiratory: Negative for cough and shortness of breath. Cardiovascular: Negative for chest pain and palpitations. Gastrointestinal: Negative for abdominal pain and heartburn. Genitourinary: Negative for frequency and urgency. Musculoskeletal: Negative for joint pain and myalgias. Neurological: Negative for dizziness, tingling, sensory change, weakness and headaches. Psychiatric/Behavioral: Negative for depression, memory loss and substance abuse. PHYSICAL EXAM     Visit Vitals  /79 (BP 1 Location: Left arm, BP Patient Position: Sitting)   Pulse 74   Temp 98.2 °F (36.8 °C) (Oral)   Resp 16   Ht 5' 5\" (1.651 m)   SpO2 97%   BMI 27.39 kg/m²       Physical Exam  Vitals signs and nursing note reviewed. HENT:      Head: Normocephalic and atraumatic. Right Ear: External ear normal.      Left Ear: External ear normal.   Cardiovascular:      Rate and Rhythm: Normal rate and regular rhythm. Heart sounds: Normal heart sounds. Pulmonary:      Effort: Pulmonary effort is normal.      Breath sounds: Normal breath sounds. Musculoskeletal: Normal range of motion. Skin:     General: Skin is warm and dry. Neurological:      Mental Status: She is alert and oriented to person, place, and time. Gait: Gait is intact. Psychiatric:         Mood and Affect: Affect normal.         Judgment: Judgment normal.       ASSESSMENT/ PLAN   Diagnoses and all orders for this visit:    1. Hyperlipidemia, unspecified hyperlipidemia type  -     LIPID PANEL; Future    2. Anxiety about health  -     ALPRAZolam (XANAX) 0.25 mg tablet; alprazolam 0.25 mg tablet  TAKE 1 TABLET BY MOUTH TWICE A DAY AS NEEDED    3. Encounter for immunization  -     PNEUMOCOCCAL POLYSACCHARIDE VACCINE, 23-VALENT, ADULT OR IMMUNOSUPPRESSED PT DOSE,  -     ADMIN INFLUENZA VIRUS VAC  -     ADMIN PNEUMOCOCCAL VACCINE    4.  Postmenopausal bone loss  - DEXA BONE DENSITY STUDY AXIAL; Future    5. Need for hepatitis C screening test  -     HCV AB W/RFLX TO NEELAM; Future      Disclaimer:  Advised patient to call back or return to office if symptoms worsen/change/persist.  Discussed expected course/resolution/complications of diagnosis in detail with patient.     Medication risks/benefits/alternatives discussed with patient. Patient was given an after visit summary which includes diagnoses, current medications, & vitals.      Discussed patient instructions and advised to read to all patient instructions regarding care.      Patient expressed understanding with the diagnosis and plan. This note will not be viewable in [address]         Alex Magana NP  11/6/2020        (This document has been electronically signed)

## 2020-11-09 DIAGNOSIS — Z11.59 NEED FOR HEPATITIS C SCREENING TEST: ICD-10-CM

## 2020-11-09 DIAGNOSIS — E78.5 HYPERLIPIDEMIA, UNSPECIFIED HYPERLIPIDEMIA TYPE: ICD-10-CM

## 2020-11-10 LAB
CHOLEST SERPL-MCNC: 223 MG/DL
HCV AB S/CO SERPL IA: <0.1 S/CO RATIO (ref 0–0.9)
HCV AB SERPL QL IA: NORMAL
HDLC SERPL-MCNC: 72 MG/DL
HDLC SERPL: 3.1 {RATIO} (ref 0–5)
LDLC SERPL CALC-MCNC: 124 MG/DL (ref 0–100)
LIPID PROFILE,FLP: ABNORMAL
TRIGL SERPL-MCNC: 135 MG/DL (ref ?–150)
VLDLC SERPL CALC-MCNC: 27 MG/DL

## 2020-11-11 ENCOUNTER — TELEPHONE (OUTPATIENT)
Dept: PRIMARY CARE CLINIC | Age: 70
End: 2020-11-11

## 2020-11-11 NOTE — TELEPHONE ENCOUNTER
Patient has started taking fish oil with medications,states she can not take Crestor, she will watch her diet and try to lose weight.

## 2020-11-23 DIAGNOSIS — E78.5 HYPERLIPIDEMIA, UNSPECIFIED HYPERLIPIDEMIA TYPE: ICD-10-CM

## 2020-11-23 RX ORDER — SIMVASTATIN 40 MG/1
TABLET, FILM COATED ORAL
Qty: 90 TAB | Refills: 0 | Status: SHIPPED | OUTPATIENT
Start: 2020-11-23 | End: 2021-03-09 | Stop reason: SDUPTHER

## 2020-11-25 ENCOUNTER — VIRTUAL VISIT (OUTPATIENT)
Dept: PRIMARY CARE CLINIC | Age: 70
End: 2020-11-25
Payer: MEDICARE

## 2020-11-25 DIAGNOSIS — J01.00 ACUTE NON-RECURRENT MAXILLARY SINUSITIS: Primary | ICD-10-CM

## 2020-11-25 PROCEDURE — 99213 OFFICE O/P EST LOW 20 MIN: CPT | Performed by: NURSE PRACTITIONER

## 2020-11-25 RX ORDER — AMOXICILLIN 875 MG/1
875 TABLET, FILM COATED ORAL 2 TIMES DAILY
Qty: 20 TAB | Refills: 0 | Status: SHIPPED | OUTPATIENT
Start: 2020-11-25 | End: 2020-12-05

## 2020-11-25 NOTE — PROGRESS NOTES
[facility]   [address], [address] [facility]  P: [phone]  F: [phone]    KEVIN Cruz is a 79 y.o. female who is seen over telehealth for Sinus Pain x 1 week,  reports mainly right sided. Reports a scratchy throat and right earache as well. Has tried some OTC Sudafed without much benefit. Presents today to discuss treatment options for concern for sinus infection. There are no active problems to display for this patient. Past Medical History:   Diagnosis Date    Cancer ([facility] [address]     non hodskins folicular lymphoma     Past Surgical History:   Procedure Laterality Date    BREAST SURGERY PROCEDURE UNLISTED      lump from breast benign    HX GYN      partial hysterectomy     Social History     Socioeconomic History    Marital status:      Spouse name: Not on file    Number of children: Not on file    Years of education: Not on file    Highest education level: Not on file   Occupational History    Not on file   Social Needs    Financial resource strain: Not on file    Food insecurity     Worry: Not on file     Inability: Not on file    Transportation needs     Medical: Not on file     Non-medical: Not on file   Tobacco Use    Smoking status: Former Smoker     Last attempt to quit: 3/29/2004     Years since quittin.6    Smokeless tobacco: Never Used   Substance and Sexual Activity    Alcohol use:  Yes     Alcohol/week: 3.0 standard drinks     Types: 3 Glasses of wine per week     Frequency: Never    Drug use: Never    Sexual activity: Not on file   Lifestyle    Physical activity     Days per week: Not on file     Minutes per session: Not on file    Stress: Not on file   Relationships    Social connections     Talks on phone: Not on file     Gets together: Not on file     Attends Buddhist service: Not on file     Active member of club or organization: Not on file     Attends meetings of clubs or organizations: Not on file Relationship status: Not on file    Intimate partner violence     Fear of current or ex partner: Not on file     Emotionally abused: Not on file     Physically abused: Not on file     Forced sexual activity: Not on file   Other Topics Concern    Not on file   Social History Narrative    ** Merged History Encounter **          No family history on file. Allergies   Allergen Reactions    Niacin Preparations Rash and Swelling    Other Plant, Animal, Environmental Hives     metals    Niacin Angioedema, Rash and Swelling       Current Outpatient Medications   Medication Sig Dispense Refill    amoxicillin (AMOXIL) 875 mg tablet Take 1 Tab by mouth two (2) times a day for 10 days. 20 Tab 0    simvastatin (ZOCOR) 40 mg tablet Take 1 tablet by mouth nightly 90 Tab 0    ALPRAZolam (XANAX) 0.25 mg tablet alprazolam 0.25 mg tablet  TAKE 1 TABLET BY MOUTH TWICE A DAY AS NEEDED 30 Tab 0    ondansetron (ZOFRAN ODT) 4 mg disintegrating tablet Take 1 Tab by mouth every eight (8) hours as needed for Nausea or Vomiting. 15 Tab 0    estradiol acetate 0.05 mg/24 hr ring Insert 1 ring vaginally q 3 months. 7 Each 0    biotin 100 mg/gram powd Take 10,000 mcg by mouth.  cholecalciferol, vitamin D3, (VITAMIN D3) 2,000 unit tab Take  by mouth.  coenzyme q10 (CO Q-10) 10 mg cap Take 10 mg by mouth daily.  cetirizine (ZYRTEC) 10 mg tablet Take 1 Tab by mouth nightly. 30 Tab 1    fluticasone propionate (FLONASE) 50 mcg/actuation nasal spray Take as directed. 1 Bottle 0    Biotin 2,500 mcg cap Take 2,500 mg by mouth.  aspirin delayed-release 81 mg tablet Take 81 mg by mouth daily.  cholecalciferol, VITAMIN D3, (VITAMIN D3) 5,000 unit tab tablet Take 5,000 Units by mouth daily. Indications: takes 10,000U             The medications were reviewed and updated in the medical record. The past medical history, past surgical history, and family history were reviewed and updated in the medical record.     REVIEW OF SYSTEMS   Review of Systems   Constitutional: Negative for malaise/fatigue. HENT: Positive for congestion, ear pain and sinus pain. Eyes: Negative for blurred vision and pain. Respiratory: Negative for cough and shortness of breath. Cardiovascular: Negative for chest pain and palpitations. Gastrointestinal: Negative for abdominal pain and heartburn. Genitourinary: Negative for frequency and urgency. Musculoskeletal: Negative for joint pain and myalgias. Neurological: Positive for headaches. Negative for dizziness, tingling, sensory change and weakness. Psychiatric/Behavioral: Negative for depression, memory loss and substance abuse. PHYSICAL EXAM   NO VITALS WERE TAKEN FOR THIS VISIT    Physical Exam  Constitutional:       Appearance: Normal appearance. HENT:      Head: Normocephalic and atraumatic. Right Ear: External ear normal.      Left Ear: External ear normal.   Eyes:      Conjunctiva/sclera: Conjunctivae normal.   Pulmonary:      Effort: Pulmonary effort is normal.   Neurological:      Mental Status: She is alert and oriented to person, place, and time. Mental status is at baseline. Psychiatric:         Speech: Speech normal.         Behavior: Behavior normal. Behavior is cooperative. Thought Content: Thought content normal.       ASSESSMENT/ PLAN   Diagnoses and all orders for this visit:    1. Acute non-recurrent maxillary sinusitis  -     amoxicillin (AMOXIL) 875 mg tablet; Take 1 Tab by mouth two (2) times a day for 10 days.  -Sinus Treatments:  1. Nasal rinses:  Saline rinses or salt water rinses or Neti pot  2. Anti-histamines: allegra (fenofexadine) or claritn (loratidine) or zyrtec (certizine)  3. Nasal steroids: Nasacort and Flonase (are over the counter & prescription)    I was in the office while conducting this encounter.     Consent:  She and/or her healthcare decision maker is aware that this patient-initiated Telehealth encounter is a billable service, with coverage as determined by her insurance carrier. She is aware that she may receive a bill and has provided verbal consent to proceed: Yes    This virtual visit was conducted via [facility]. Pursuant to the emergency declaration under the [facility], [facility] waiver authority and the [employer] Act, this Virtual  Visit was conducted to reduce the patient's risk of exposure to COVID-19 and provide continuity of care for an established patient. Services were provided through a video synchronous discussion virtually to substitute for in-person clinic visit. Due to this being a TeleHealth evaluation, many elements of the physical examination are unable to be assessed. Total Time: minutes: 11-20 minutes. Disclaimer:  Advised patient to call back or return to office if symptoms worsen/change/persist.  Discussed expected course/resolution/complications of diagnosis in detail with patient. Medication risks/benefits/alternatives discussed with patient. Patient was given an after visit summary which includes diagnoses, current medications, & vitals. Discussed patient instructions and advised to read to all patient instructions regarding care. Patient expressed understanding with the diagnosis and plan. This note will not be viewable in [address]         Abundio Haro NP  11/25/2020        (This document has been electronically signed)

## 2020-12-29 ENCOUNTER — VIRTUAL VISIT (OUTPATIENT)
Dept: PRIMARY CARE CLINIC | Age: 70
End: 2020-12-29
Payer: MEDICARE

## 2020-12-29 DIAGNOSIS — R59.0 POSTERIOR CERVICAL LYMPHADENOPATHY: Primary | ICD-10-CM

## 2020-12-29 PROCEDURE — 99213 OFFICE O/P EST LOW 20 MIN: CPT | Performed by: NURSE PRACTITIONER

## 2020-12-29 NOTE — PROGRESS NOTES
[facility]   [address], [address] [facility]  P: [phone]  F: [phone]    KEVIN Marquez is a 79 y.o. female who is seen over telehealth for Neck Swelling, x 3 days to the base of her right neck laterally/posteriorly. She presents today over telehealth to discuss whether the area is consistent with lymph node swelling. She does have a history of non-Hodgkin's follicular lymphoma. Does report some ongoing sinus congestion. Denies fevers or cough. There are no active problems to display for this patient. Past Medical History:   Diagnosis Date    Cancer ([facility] [address]     non hodskins folicular lymphoma     Past Surgical History:   Procedure Laterality Date    BREAST SURGERY PROCEDURE UNLISTED      lump from breast benign    HX GYN      partial hysterectomy     Social History     Socioeconomic History    Marital status:      Spouse name: Not on file    Number of children: Not on file    Years of education: Not on file    Highest education level: Not on file   Occupational History    Not on file   Social Needs    Financial resource strain: Not on file    Food insecurity     Worry: Not on file     Inability: Not on file    Transportation needs     Medical: Not on file     Non-medical: Not on file   Tobacco Use    Smoking status: Former Smoker     Quit date: 3/29/2004     Years since quittin.7    Smokeless tobacco: Never Used   Substance and Sexual Activity    Alcohol use:  Yes     Alcohol/week: 3.0 standard drinks     Types: 3 Glasses of wine per week     Frequency: Never    Drug use: Never    Sexual activity: Not on file   Lifestyle    Physical activity     Days per week: Not on file     Minutes per session: Not on file    Stress: Not on file   Relationships    Social connections     Talks on phone: Not on file     Gets together: Not on file     Attends Restorationist service: Not on file     Active member of club or organization: Not on file Attends meetings of clubs or organizations: Not on file     Relationship status: Not on file    Intimate partner violence     Fear of current or ex partner: Not on file     Emotionally abused: Not on file     Physically abused: Not on file     Forced sexual activity: Not on file   Other Topics Concern    Not on file   Social History Narrative    ** Merged History Encounter **          No family history on file. Allergies   Allergen Reactions    Niacin Preparations Rash and Swelling    Other Plant, Animal, Environmental Hives     metals    Niacin Angioedema, Rash and Swelling       Current Outpatient Medications   Medication Sig Dispense Refill    simvastatin (ZOCOR) 40 mg tablet Take 1 tablet by mouth nightly 90 Tab 0    ALPRAZolam (XANAX) 0.25 mg tablet alprazolam 0.25 mg tablet  TAKE 1 TABLET BY MOUTH TWICE A DAY AS NEEDED 30 Tab 0    ondansetron (ZOFRAN ODT) 4 mg disintegrating tablet Take 1 Tab by mouth every eight (8) hours as needed for Nausea or Vomiting. 15 Tab 0    estradiol acetate 0.05 mg/24 hr ring Insert 1 ring vaginally q 3 months. 7 Each 0    biotin 100 mg/gram powd Take 10,000 mcg by mouth.  cholecalciferol, vitamin D3, (VITAMIN D3) 2,000 unit tab Take  by mouth.  coenzyme q10 (CO Q-10) 10 mg cap Take 10 mg by mouth daily.  cetirizine (ZYRTEC) 10 mg tablet Take 1 Tab by mouth nightly. 30 Tab 1    fluticasone propionate (FLONASE) 50 mcg/actuation nasal spray Take as directed. 1 Bottle 0    Biotin 2,500 mcg cap Take 2,500 mg by mouth.  aspirin delayed-release 81 mg tablet Take 81 mg by mouth daily.  cholecalciferol, VITAMIN D3, (VITAMIN D3) 5,000 unit tab tablet Take 5,000 Units by mouth daily. Indications: takes 10,000U             The medications were reviewed and updated in the medical record. The past medical history, past surgical history, and family history were reviewed and updated in the medical record.     REVIEW OF SYSTEMS   Review of Systems Constitutional: Negative for malaise/fatigue. HENT: Negative for congestion. Eyes: Negative for blurred vision and pain. Respiratory: Negative for cough and shortness of breath. Cardiovascular: Negative for chest pain and palpitations. Gastrointestinal: Negative for abdominal pain and heartburn. Genitourinary: Negative for frequency and urgency. Musculoskeletal: Negative for joint pain and myalgias. Neurological: Negative for dizziness, tingling, sensory change, weakness and headaches. Psychiatric/Behavioral: Negative for depression, memory loss and substance abuse. PHYSICAL EXAM   NO VITALS WERE TAKEN FOR THIS VISIT    Physical Exam  Constitutional:       Appearance: Normal appearance. HENT:      Head: Normocephalic and atraumatic. Right Ear: External ear normal.      Left Ear: External ear normal.   Eyes:      Conjunctiva/sclera: Conjunctivae normal.   Pulmonary:      Effort: Pulmonary effort is normal.   Lymphadenopathy:      Cervical: Cervical adenopathy present. Right cervical: Posterior cervical adenopathy present. Neurological:      Mental Status: She is alert and oriented to person, place, and time. Mental status is at baseline. Psychiatric:         Speech: Speech normal.         Behavior: Behavior normal. Behavior is cooperative. Thought Content: Thought content normal.       ASSESSMENT/ PLAN   Diagnoses and all orders for this visit:    1. Posterior cervical lymphadenopathy      -She should monitor for the next 1 to 2 weeks. She should update her oncologist.  Alejandro Kruse lymph node swelling is nonspecific and could be related to her sinus inflammation. Follow-up ultrasound recommended if not improving in 1-2 weeks. I was at home while conducting this encounter. Consent:  She and/or her healthcare decision maker is aware that this patient-initiated Telehealth encounter is a billable service, with coverage as determined by her insurance carrier.  She is aware that she may receive a bill and has provided verbal consent to proceed: Yes    This virtual visit was conducted via [facility]. Pursuant to the emergency declaration under the [facility], [facility] waiver authority and the [employer] and [employer] Act, this Virtual  Visit was conducted to reduce the patient's risk of exposure to COVID-19 and provide continuity of care for an established patient. Services were provided through a video synchronous discussion virtually to substitute for in-person clinic visit. Due to this being a TeleHealth evaluation, many elements of the physical examination are unable to be assessed. Total Time: minutes: 11-20 minutes. Disclaimer:  Advised patient to call back or return to office if symptoms worsen/change/persist.  Discussed expected course/resolution/complications of diagnosis in detail with patient. Medication risks/benefits/alternatives discussed with patient. Patient was given an after visit summary which includes diagnoses, current medications, & vitals. Discussed patient instructions and advised to read to all patient instructions regarding care. Patient expressed understanding with the diagnosis and plan. This note will not be viewable in [address]         Karolina Scott NP  12/29/2020        (This document has been electronically signed)

## 2021-01-06 ENCOUNTER — VIRTUAL VISIT (OUTPATIENT)
Dept: PRIMARY CARE CLINIC | Age: 71
End: 2021-01-06
Payer: MEDICARE

## 2021-01-06 DIAGNOSIS — R59.0 POSTERIOR CERVICAL LYMPHADENOPATHY: ICD-10-CM

## 2021-01-06 DIAGNOSIS — J01.00 ACUTE NON-RECURRENT MAXILLARY SINUSITIS: Primary | ICD-10-CM

## 2021-01-06 PROCEDURE — 99443 PR PHYS/QHP TELEPHONE EVALUATION 21-30 MIN: CPT | Performed by: NURSE PRACTITIONER

## 2021-01-06 RX ORDER — AMOXICILLIN AND CLAVULANATE POTASSIUM 875; 125 MG/1; MG/1
1 TABLET, FILM COATED ORAL EVERY 12 HOURS
Qty: 20 TAB | Refills: 0 | Status: SHIPPED | OUTPATIENT
Start: 2021-01-06 | End: 2021-01-16

## 2021-01-06 NOTE — PROGRESS NOTES
[facility]   [address], [address] [facility]  P: [phone]  F: [phone]    KEVIN Rodríguez is a 79 y.o. female who is seen over telehealth for Sinus Pain and persistent posterior cervical lymphadenopathy, with concern today for sinus infection. She has been taking Mucinex D with benefit but reports symptoms have been continuing over the last 7+ days. Denies fevers or respiratory symptoms. There are no active problems to display for this patient. Past Medical History:   Diagnosis Date    Cancer ([facility] [address]     non hodskins folicular lymphoma     Past Surgical History:   Procedure Laterality Date    BREAST SURGERY PROCEDURE UNLISTED      lump from breast benign    HX GYN      partial hysterectomy     Social History     Socioeconomic History    Marital status:      Spouse name: Not on file    Number of children: Not on file    Years of education: Not on file    Highest education level: Not on file   Occupational History    Not on file   Social Needs    Financial resource strain: Not on file    Food insecurity     Worry: Not on file     Inability: Not on file    Transportation needs     Medical: Not on file     Non-medical: Not on file   Tobacco Use    Smoking status: Former Smoker     Quit date: 3/29/2004     Years since quittin.7    Smokeless tobacco: Never Used   Substance and Sexual Activity    Alcohol use:  Yes     Alcohol/week: 3.0 standard drinks     Types: 3 Glasses of wine per week     Frequency: Never    Drug use: Never    Sexual activity: Not on file   Lifestyle    Physical activity     Days per week: Not on file     Minutes per session: Not on file    Stress: Not on file   Relationships    Social connections     Talks on phone: Not on file     Gets together: Not on file     Attends Buddhist service: Not on file     Active member of club or organization: Not on file     Attends meetings of clubs or organizations: Not on file Relationship status: Not on file    Intimate partner violence     Fear of current or ex partner: Not on file     Emotionally abused: Not on file     Physically abused: Not on file     Forced sexual activity: Not on file   Other Topics Concern    Not on file   Social History Narrative    ** Merged History Encounter **          No family history on file. Allergies   Allergen Reactions    Niacin Preparations Rash and Swelling    Other Plant, Animal, Environmental Hives     metals    Niacin Angioedema, Rash and Swelling       Current Outpatient Medications   Medication Sig Dispense Refill    amoxicillin-clavulanate (AUGMENTIN) 875-125 mg per tablet Take 1 Tab by mouth every twelve (12) hours for 10 days. 20 Tab 0    simvastatin (ZOCOR) 40 mg tablet Take 1 tablet by mouth nightly 90 Tab 0    ALPRAZolam (XANAX) 0.25 mg tablet alprazolam 0.25 mg tablet  TAKE 1 TABLET BY MOUTH TWICE A DAY AS NEEDED 30 Tab 0    ondansetron (ZOFRAN ODT) 4 mg disintegrating tablet Take 1 Tab by mouth every eight (8) hours as needed for Nausea or Vomiting. 15 Tab 0    estradiol acetate 0.05 mg/24 hr ring Insert 1 ring vaginally q 3 months. 7 Each 0    biotin 100 mg/gram powd Take 10,000 mcg by mouth.  cholecalciferol, vitamin D3, (VITAMIN D3) 2,000 unit tab Take  by mouth.  coenzyme q10 (CO Q-10) 10 mg cap Take 10 mg by mouth daily.  cetirizine (ZYRTEC) 10 mg tablet Take 1 Tab by mouth nightly. 30 Tab 1    fluticasone propionate (FLONASE) 50 mcg/actuation nasal spray Take as directed. 1 Bottle 0    Biotin 2,500 mcg cap Take 2,500 mg by mouth.  aspirin delayed-release 81 mg tablet Take 81 mg by mouth daily.  cholecalciferol, VITAMIN D3, (VITAMIN D3) 5,000 unit tab tablet Take 5,000 Units by mouth daily. Indications: takes 10,000U             The medications were reviewed and updated in the medical record.   The past medical history, past surgical history, and family history were reviewed and updated in the medical record. REVIEW OF SYSTEMS   Review of Systems   HENT: Positive for sinus pain. PHYSICAL EXAM   NO VITALS WERE TAKEN FOR THIS VISIT  Telephone encounter only, no physical exam.  ASSESSMENT/ PLAN   Diagnoses and all orders for this visit:    1. Acute non-recurrent maxillary sinusitis  -     amoxicillin-clavulanate (AUGMENTIN) 875-125 mg per tablet; Take 1 Tab by mouth every twelve (12) hours for 10 days.  -Sinus Treatments:  1. Nasal rinses:  Saline rinses or salt water rinses or Neti pot  2. Anti-histamines: allegra (fenofexadine) or claritn (loratidine) or zyrtec (certizine)  3. Nasal steroids: Nasacort and Flonase (are over the counter & prescription)    2. Posterior cervical lymphadenopathy  -     amoxicillin-clavulanate (AUGMENTIN) 875-125 mg per tablet; Take 1 Tab by mouth every twelve (12) hours for 10 days. -If not improving after antibiotics, consider ultrasound. The patient notified her oncologist as well. I was in the office while conducting this encounter. Consent:  She and/or her healthcare decision maker is aware that this patient-initiated Telehealth encounter is a billable service, with coverage as determined by her insurance carrier. She is aware that she may receive a bill and has provided verbal consent to proceed: Yes    This virtual visit was conducted telephone encounter only. -  I affirm this is a Patient Initiated Episode with an Established Patient who has not had a related appointment within my department in the past 7 days or scheduled within the next 24 hours. Note: this encounter is not billable if this call serves to triage the patient into an appointment for the relevant concern. Total Time: minutes: 21-30 minutes. Disclaimer:  Advised patient to call back or return to office if symptoms worsen/change/persist.  Discussed expected course/resolution/complications of diagnosis in detail with patient.      Medication risks/benefits/alternatives discussed with patient. Patient was given an after visit summary which includes diagnoses, current medications, & vitals. Discussed patient instructions and advised to read to all patient instructions regarding care. Patient expressed understanding with the diagnosis and plan. This note will not be viewable in [address]         Nessa Amador NP  1/6/2021        (This document has been electronically signed)

## 2021-01-08 ENCOUNTER — TELEPHONE (OUTPATIENT)
Dept: PRIMARY CARE CLINIC | Age: 71
End: 2021-01-08

## 2021-01-08 NOTE — TELEPHONE ENCOUNTER
----- Message from Naty Keller sent at 1/8/2021 11:16 AM EST -----  Regarding: Kiki Rodríguez NP, telephone  General Message/Vendor Calls    Caller's first and last name: NA       Reason for call: Notifying Amoxicillin sent in instead of Augmentin which is diff.  Than discussed  / US Schedule request      Callback required yes/no and why: Yes       Best contact number(s):[phone]       Details to clarify the request please schedule US for lump in neck      Veronica Saunders

## 2021-01-11 DIAGNOSIS — C82.90 FOLLICULAR LYMPHOMA, UNSPECIFIED FOLLICULAR LYMPHOMA TYPE, UNSPECIFIED BODY REGION (HCC): ICD-10-CM

## 2021-01-11 DIAGNOSIS — R59.0 POSTERIOR CERVICAL LYMPHADENOPATHY: Primary | ICD-10-CM

## 2021-01-12 ENCOUNTER — TELEPHONE (OUTPATIENT)
Dept: PRIMARY CARE CLINIC | Age: 71
End: 2021-01-12

## 2021-01-12 NOTE — TELEPHONE ENCOUNTER
lvm with number to schedule ultra sound with central scheduling if ultra sound has not already been scheduled.

## 2021-01-12 NOTE — TELEPHONE ENCOUNTER
----- Message from Ko Carrasco sent at 1/12/2021  1:20 PM EST -----  Regarding: Telephone  General Message/Vendor Calls    Caller's first and last name: Massimo Carrera       Reason for call: PT wants to talk with the doctor about having an ultrasound done on a lump on her neck.        Callback required yes/no and why: Yes to discuss this further       Best contact number(s): [phone]      Details to clarify the request: n/a       Ko Carrasco

## 2021-01-13 NOTE — TELEPHONE ENCOUNTER
Returned call to patient to let her know that WALTER Ledesma NP wants patient to follow up with Oncologist regarding getting the US and the results for lump on her neck.

## 2021-01-15 ENCOUNTER — TELEPHONE (OUTPATIENT)
Dept: PRIMARY CARE CLINIC | Age: 71
End: 2021-01-15

## 2021-01-15 ENCOUNTER — HOSPITAL ENCOUNTER (OUTPATIENT)
Dept: ULTRASOUND IMAGING | Age: 71
Discharge: HOME | End: 2021-01-15
Attending: NURSE PRACTITIONER
Payer: MEDICARE

## 2021-01-15 DIAGNOSIS — R59.0 POSTERIOR CERVICAL LYMPHADENOPATHY: ICD-10-CM

## 2021-01-15 DIAGNOSIS — C82.90 FOLLICULAR LYMPHOMA, UNSPECIFIED FOLLICULAR LYMPHOMA TYPE, UNSPECIFIED BODY REGION (HCC): ICD-10-CM

## 2021-01-15 PROCEDURE — 76536 US EXAM OF HEAD AND NECK: CPT

## 2021-01-15 NOTE — TELEPHONE ENCOUNTER
Confirmed patient id and advised to continue antibiotic and follow up with oncologist.  Received verbal understanding.

## 2021-01-15 NOTE — TELEPHONE ENCOUNTER
Ppt would like to know if she needs to stop taking the antibiotic.  She states her imaging results showed multiple swollen lymph nodes

## 2021-02-20 ENCOUNTER — IMMUNIZATION (OUTPATIENT)
Dept: INTERNAL MEDICINE CLINIC | Age: 71
End: 2021-02-20
Payer: MEDICARE

## 2021-02-20 DIAGNOSIS — Z23 ENCOUNTER FOR IMMUNIZATION: Primary | ICD-10-CM

## 2021-02-20 PROCEDURE — 0001A COVID-19, MRNA, LNP-S, PF, 30MCG/0.3ML DOSE(PFIZER): CPT | Performed by: FAMILY MEDICINE

## 2021-02-20 PROCEDURE — 91300 COVID-19, MRNA, LNP-S, PF, 30MCG/0.3ML DOSE(PFIZER): CPT | Performed by: FAMILY MEDICINE

## 2021-03-09 DIAGNOSIS — E78.5 HYPERLIPIDEMIA, UNSPECIFIED HYPERLIPIDEMIA TYPE: ICD-10-CM

## 2021-03-09 RX ORDER — SIMVASTATIN 40 MG/1
TABLET, FILM COATED ORAL
Qty: 90 TAB | Refills: 0 | Status: SHIPPED | OUTPATIENT
Start: 2021-03-09 | End: 2021-06-14 | Stop reason: SDUPTHER

## 2021-03-13 ENCOUNTER — IMMUNIZATION (OUTPATIENT)
Dept: INTERNAL MEDICINE CLINIC | Age: 71
End: 2021-03-13
Payer: MEDICARE

## 2021-03-13 DIAGNOSIS — Z23 ENCOUNTER FOR IMMUNIZATION: Primary | ICD-10-CM

## 2021-03-13 PROCEDURE — 0002A COVID-19, MRNA, LNP-S, PF, 30MCG/0.3ML DOSE(PFIZER): CPT | Performed by: FAMILY MEDICINE

## 2021-03-13 PROCEDURE — 91300 COVID-19, MRNA, LNP-S, PF, 30MCG/0.3ML DOSE(PFIZER): CPT | Performed by: FAMILY MEDICINE

## 2021-04-26 ENCOUNTER — VIRTUAL VISIT (OUTPATIENT)
Dept: PRIMARY CARE CLINIC | Age: 71
End: 2021-04-26
Payer: MEDICARE

## 2021-04-26 DIAGNOSIS — R10.13 EPIGASTRIC PAIN: ICD-10-CM

## 2021-04-26 DIAGNOSIS — K21.9 GASTROESOPHAGEAL REFLUX DISEASE, UNSPECIFIED WHETHER ESOPHAGITIS PRESENT: Primary | ICD-10-CM

## 2021-04-26 DIAGNOSIS — C83.30 DIFFUSE LARGE B-CELL LYMPHOMA, UNSPECIFIED BODY REGION (HCC): ICD-10-CM

## 2021-04-26 PROCEDURE — 99214 OFFICE O/P EST MOD 30 MIN: CPT | Performed by: FAMILY MEDICINE

## 2021-04-26 RX ORDER — FAMOTIDINE 20 MG/1
20 TABLET, FILM COATED ORAL
Qty: 30 TAB | Refills: 1 | Status: SHIPPED | OUTPATIENT
Start: 2021-04-26 | End: 2021-07-27

## 2021-04-26 RX ORDER — PANTOPRAZOLE SODIUM 40 MG/1
40 TABLET, DELAYED RELEASE ORAL DAILY
Qty: 30 TAB | Refills: 2 | Status: SHIPPED | OUTPATIENT
Start: 2021-04-26 | End: 2021-07-27 | Stop reason: ALTCHOICE

## 2021-04-26 RX ORDER — LORATADINE 10 MG/1
10 TABLET ORAL
COMMUNITY

## 2021-04-26 NOTE — PROGRESS NOTES
Eber Cobos  79 y.o. female  1950  [id number]  [id number]   [facility]   Telemedicine Progress Note  Froylan Schmidt DO       Encounter Date and Time: April 27, 2021 at 2:53 PM    Consent: Eber Cobos, who was seen by synchronous (real-time) audio-video technology, and/or her healthcare decision maker, is aware that this patient-initiated, Telehealth encounter on 4/26/2021 is a billable service, with coverage as determined by her insurance carrier. She is aware that she may receive a bill and has provided verbal consent to proceed: NA - Consent obtained within past 12 months. Chief Complaint   Patient presents with    Epigastric Pain     has been going on 4 days      History of Present Illness   Eber Cobos is a 79 y.o. female was evaluated by synchronous (real-time) audio-video technology from home, through a secure patient portal.    Had NH follicular lymphoma for 13 years and did low grade chemo which kept it under control. Was on Rituxin in 2014. 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 Has had 4 treatments and is supposed to have a total of 6. States that the chemo appears to be working. 4-5 days ago she started having burning epigastric pain and reflux symptoms. States she also has constipation. Has been taking Miralax and Prilosec 1 tablet daily for last month. Her Oncologist is aware of this pain. States Prilosec is helping minimally. States after she eats she feels very full and gets bloated and crampy. States eating anything makes it worse. States she still has her gallbladder. Had a CT scan with contrast \"from head to knees\" recently at [facility] as well as thorough blood work with CBC/ CMP and they told her everything looked okay. Asked if she has any cardiac symptoms. Patient states she feels confident this is not her heart. Denies chest pain, shortness of breath, palpitations.     Review of Systems   Review of Systems   Constitutional: Negative for chills and fever. Respiratory: Negative for shortness of breath. Cardiovascular: Negative for chest pain. Gastrointestinal: Positive for abdominal pain, constipation and heartburn. Negative for blood in stool, diarrhea, nausea and vomiting. Vitals/Objective:     General: alert, cooperative, no distress   Mental  status: mental status: alert, oriented to person, place, and time, normal mood, behavior, speech, dress, motor activity, and thought processes   Resp: resp: normal effort and no respiratory distress   Neuro: neuro: no gross deficits   Skin: skin: no discoloration or lesions of concern on visible areas   Due to this being a TeleHealth evaluation, many elements of the physical examination are unable to be assessed. Assessment and Plan:   Time-based coding, delete if not needed: I spent at least 10 minutes with this established patient, and >50% of the time was spent counseling and/or coordinating care regarding GERD, lymphoma    1. Gastroesophageal reflux disease, unspecified whether esophagitis present  Will switch to Protonix and add Pepcid. Could be side effect of chemo. Has not undergone radiation. States she will bring us copy of CT and labs tomorrow to office. Discussed if symptoms do not resolve or are getting worse she needs to be seen in person. She has concerns about coming into the office given her immunosuppression. Discussed if she has worsening abdominal pain, pain in chest, shortness of breath, blood in stool, fevers, vomiting, pain under R ribs she needs to be seen urgently in ER. 2. Diffuse large B-cell lymphoma, unspecified body region ([facility] [address]  Under care of [facility]. 3. Epigastric pain    Time spent in direct conversation with the patient to include medical condition(s) discussed, assessment and treatment plan:       We discussed the expected course, resolution and complications of the diagnosis(es) in detail.   Medication risks, benefits, costs, interactions, and alternatives were discussed as indicated. I advised her to contact the office if her condition worsens, changes or fails to improve as anticipated. She expressed understanding with the diagnosis(es) and plan. Patient understands that this encounter was a temporary measure, and the importance of further follow up and examination was emphasized. Patient verbalized understanding. Patient informed to follow up: in 1-2 weeks if symptoms not improving. Electronically Signed: Kristin StantonDO Engel Parent is a 79 y.o. female who was evaluated by an audio-video encounter for concerns as above. Patient identification was verified prior to start of the visit. A caregiver was present when appropriate. Due to this being a TeleHealth encounter (During FYWNQ-36 public health emergency), evaluation of the following organ systems was limited: Vitals/Constitutional/EENT/Resp/CV/GI//MS/Neuro/Skin/Heme-Lymph-Imm. Pursuant to the emergency declaration under the [facility] waiver authority and the [employer] Act, this Virtual Visit was conducted, with patient's (and/or legal guardian's) consent, to reduce the patient's risk of exposure to COVID-19 and provide necessary medical care. Services were provided through a synchronous discussion virtually to substitute for in-person clinic visit. I was in the office. The patient was at home. History   Patients past medical, surgical and family histories were reviewed and updated. Past Medical History:   Diagnosis Date    Cancer ([facility] [address]     non hodskins folicular lymphoma    Non Hodgkin's lymphoma ([facility] [address]      Past Surgical History:   Procedure Laterality Date    HX GYN      partial hysterectomy    OR BREAST SURGERY PROCEDURE UNLISTED      lump from breast benign     No family history on file.   Social History Socioeconomic History    Marital status:      Spouse name: Not on file    Number of children: Not on file    Years of education: Not on file    Highest education level: Not on file   Occupational History    Not on file   Social Needs    Financial resource strain: Not on file    Food insecurity     Worry: Not on file     Inability: Not on file    Transportation needs     Medical: Not on file     Non-medical: Not on file   Tobacco Use    Smoking status: Former Smoker     Quit date: 3/29/2004     Years since quittin.0    Smokeless tobacco: Never Used   Substance and Sexual Activity    Alcohol use: Yes     Alcohol/week: 3.0 standard drinks     Types: 3 Glasses of wine per week     Frequency: Never    Drug use: Never    Sexual activity: Not on file   Lifestyle    Physical activity     Days per week: Not on file     Minutes per session: Not on file    Stress: Not on file   Relationships    Social connections     Talks on phone: Not on file     Gets together: Not on file     Attends Jainism service: Not on file     Active member of club or organization: Not on file     Attends meetings of clubs or organizations: Not on file     Relationship status: Not on file    Intimate partner violence     Fear of current or ex partner: Not on file     Emotionally abused: Not on file     Physically abused: Not on file     Forced sexual activity: Not on file   Other Topics Concern    Not on file   Social History Narrative    ** Merged History Encounter **          There is no problem list on file for this patient. Current Medications/Allergies   Medications and Allergies reviewed:    Current Outpatient Medications   Medication Sig Dispense Refill    loratadine (Claritin) 10 mg tablet Take 10 mg by mouth.  pantoprazole (PROTONIX) 40 mg tablet Take 1 Tab by mouth daily. 30 Tab 2    famotidine (PEPCID) 20 mg tablet Take 1 Tab by mouth nightly.  30 Tab 1    simvastatin (ZOCOR) 40 mg tablet Take 1 tablet by mouth nightly 90 Tab 0    ondansetron (ZOFRAN ODT) 4 mg disintegrating tablet Take 1 Tab by mouth every eight (8) hours as needed for Nausea or Vomiting. 15 Tab 0    biotin 100 mg/gram powd Take 10,000 mcg by mouth.  coenzyme q10 (CO Q-10) 10 mg cap Take 10 mg by mouth daily.  Biotin 2,500 mcg cap Take 2,500 mg by mouth.  aspirin delayed-release 81 mg tablet Take 81 mg by mouth daily.  cholecalciferol, VITAMIN D3, (VITAMIN D3) 5,000 unit tab tablet Take 5,000 Units by mouth daily. Indications: takes 10,000U      ALPRAZolam (XANAX) 0.25 mg tablet alprazolam 0.25 mg tablet  TAKE 1 TABLET BY MOUTH TWICE A DAY AS NEEDED 30 Tab 0    estradiol acetate 0.05 mg/24 hr ring Insert 1 ring vaginally q 3 months. 7 Each 0    cholecalciferol, vitamin D3, (VITAMIN D3) 2,000 unit tab Take  by mouth.  cetirizine (ZYRTEC) 10 mg tablet Take 1 Tab by mouth nightly. 30 Tab 1    fluticasone propionate (FLONASE) 50 mcg/actuation nasal spray Take as directed.  1 Bottle 0     Allergies   Allergen Reactions    Niacin Preparations Rash and Swelling    Other Plant, Animal, Environmental Hives     metals    Niacin Angioedema, Rash and Swelling

## 2021-04-26 NOTE — PROGRESS NOTES
Identified pt with two pt identifiers(name and ). Chief Complaint   Patient presents with    Epigastric Pain     has been going on 4 days     pt cancer is back     3 most recent PHQ Screens 2020   Little interest or pleasure in doing things Several days   Feeling down, depressed, irritable, or hopeless Several days   Total Score PHQ 2 2        There were no vitals filed for this visit. Health Maintenance Due   Topic    DTaP/Tdap/Td series (1 - Tdap)    Shingrix Vaccine Age 50> (1 of 2)    Bone Densitometry (Dexa) Screening     Medicare Yearly Exam     Breast Cancer Screen Mammogram        1. Have you been to the ER, urgent care clinic since your last visit? Hospitalized since your last visit? No    2. Have you seen or consulted any other health care providers outside of the [address] [place] [address] since your last visit? Include any pap smears or colon screening.  Oncologist

## 2021-04-27 NOTE — PATIENT INSTRUCTIONS
Gastroesophageal Reflux Disease (GERD): Care Instructions Overview Gastroesophageal reflux disease (GERD) is the backward flow of stomach acid into the esophagus. The esophagus is the tube that leads from your throat to your stomach. A one-way valve prevents the stomach acid from backing up into this tube. But when you have GERD, this valve does not close tightly enough. This can also cause pain and swelling in your esophagus. (This is called esophagitis.) If you have mild GERD symptoms including heartburn, you may be able to control the problem with antacids or over-the-counter medicine. You can also make lifestyle changes to help reduce your symptoms. These include changing your diet and eating habits, such as not eating late at night and losing weight. Follow-up care is a key part of your treatment and safety. Be sure to make and go to all appointments, and call your doctor if you are having problems. It's also a good idea to know your test results and keep a list of the medicines you take. How can you care for yourself at home? · Take your medicines exactly as prescribed. Call your doctor if you think you are having a problem with your medicine. · Your doctor may recommend over-the-counter medicine. For mild or occasional indigestion, antacids, such as Tums, Gaviscon, Mylanta, or Maalox, may help. Your doctor also may recommend over-the-counter acid reducers, such as famotidine (Pepcid AC), cimetidine (Tagamet HB), or omeprazole (Prilosec). Read and follow all instructions on the label. If you use these medicines often, talk with your doctor. · Change your eating habits. ? It's best to eat several small meals instead of two or three large meals. ? After you eat, wait 2 to 3 hours before you lie down. ? Chocolate, mint, and alcohol can make GERD worse. ? Spicy foods, foods that have a lot of acid (like tomatoes and oranges), and coffee can make GERD symptoms worse in some people.  If your symptoms are worse after you eat a certain food, you may want to stop eating that food to see if your symptoms get better. · Do not smoke or chew tobacco. Smoking can make GERD worse. If you need help quitting, talk to your doctor about stop-smoking programs and medicines. These can increase your chances of quitting for good. · If you have GERD symptoms at night, raise the head of your bed 6 to 8 inches by putting the frame on blocks or placing a foam wedge under the head of your mattress. (Adding extra pillows does not work.) · Do not wear tight clothing around your middle. · Lose weight if you need to. Losing just 5 to 10 pounds can help. When should you call for help? Call your doctor now or seek immediate medical care if: 
  · You have new or different belly pain.  
  · Your stools are black and tarlike or have streaks of blood. Watch closely for changes in your health, and be sure to contact your doctor if: 
  · Your symptoms have not improved after 2 days.  
  · Food seems to catch in your throat or chest.  
Where can you learn more? Go to http://www.gray.com/ Enter G394 in the search box to learn more about \"Gastroesophageal Reflux Disease (GERD): Care Instructions. \" Current as of: April 15, 2020               Content Version: 12.8 © 2006-2021 [employer]. Care instructions adapted under license by [employer] (which disclaims liability or warranty for this information). If you have questions about a medical condition or this instruction, always ask your healthcare professional. [place] [address] any warranty or liability for your use of this information. 
 Abdominal Pain: Care Instructions Your Care Instructions Abdominal pain has many possible causes. Some aren't serious and get better on their own in a few days. Others need more testing and treatment.  If your pain continues or gets worse, you need to be rechecked and may need more tests to find out what is wrong. You may need surgery to correct the problem. Don't ignore new symptoms, such as fever, nausea and vomiting, urination problems, pain that gets worse, and dizziness. These may be signs of a more serious problem. Your doctor may have recommended a follow-up visit in the next 8 to 12 hours. If you are not getting better, you may need more tests or treatment. The doctor has checked you carefully, but problems can develop later. If you notice any problems or new symptoms, get medical treatment right away. Follow-up care is a key part of your treatment and safety. Be sure to make and go to all appointments, and call your doctor if you are having problems. It's also a good idea to know your test results and keep a list of the medicines you take. How can you care for yourself at home? · Rest until you feel better. · To prevent dehydration, drink plenty of fluids. Choose water and other caffeine-free clear liquids until you feel better. If you have kidney, heart, or liver disease and have to limit fluids, talk with your doctor before you increase the amount of fluids you drink. · If your stomach is upset, eat mild foods, such as rice, dry toast or crackers, bananas, and applesauce. Try eating several small meals instead of two or three large ones. · Wait until 48 hours after all symptoms have gone away before you have spicy foods, alcohol, and drinks that contain caffeine. · Do not eat foods that are high in fat. · Avoid anti-inflammatory medicines such as aspirin, ibuprofen (Advil, Motrin), and naproxen (Aleve). These can cause stomach upset. Talk to your doctor if you take daily aspirin for another health problem. When should you call for help? Call 911 anytime you think you may need emergency care.  For example, call if: 
  · You passed out (lost consciousness).  
  · You pass maroon or very bloody stools.  
  · You vomit blood or what looks like coffee grounds.  
  · You have new, severe belly pain. Call your doctor now or seek immediate medical care if: 
  · Your pain gets worse, especially if it becomes focused in one area of your belly.  
  · You have a new or higher fever.  
  · Your stools are black and look like tar, or they have streaks of blood.  
  · You have unexpected vaginal bleeding.  
  · You have symptoms of a urinary tract infection. These may include: 
? Pain when you urinate. ? Urinating more often than usual. 
? Blood in your urine.  
  · You are dizzy or lightheaded, or you feel like you may faint. Watch closely for changes in your health, and be sure to contact your doctor if: 
  · You are not getting better after 1 day (24 hours). Where can you learn more? Go to http://www.gray.com/ Enter C623 in the search box to learn more about \"Abdominal Pain: Care Instructions. \" Current as of: February 26, 2020               Content Version: 12.8 © 2006-2021 [employer]. Care instructions adapted under license by [employer] (which disclaims liability or warranty for this information).  If you have questions about a medical condition or this instruction, always ask your healthcare professional. [place] [address] any warranty or liability for your use of this information.

## 2021-06-15 DIAGNOSIS — E78.5 HYPERLIPIDEMIA, UNSPECIFIED HYPERLIPIDEMIA TYPE: ICD-10-CM

## 2021-06-15 RX ORDER — SIMVASTATIN 40 MG/1
TABLET, FILM COATED ORAL
Qty: 90 TABLET | Refills: 0 | Status: SHIPPED | OUTPATIENT
Start: 2021-06-15 | End: 2021-07-27 | Stop reason: SDUPTHER

## 2021-06-15 NOTE — PROGRESS NOTES
Spoke with patient. LDL not at goal on last check. Concerned recent chemo will affect values. Recommend recheck 6 weeks after last chemo treatment so we can make adjustments to med if needed. Order placed for lipid panel. Rx sen to pharmacy for 90 days.

## 2021-07-08 ENCOUNTER — APPOINTMENT (OUTPATIENT)
Dept: GENERAL RADIOLOGY | Age: 71
End: 2021-07-08
Attending: EMERGENCY MEDICINE
Payer: MEDICARE

## 2021-07-08 ENCOUNTER — HOSPITAL ENCOUNTER (EMERGENCY)
Age: 71
Discharge: HOME | End: 2021-07-09
Attending: EMERGENCY MEDICINE
Payer: MEDICARE

## 2021-07-08 DIAGNOSIS — M79.89 LEFT ARM SWELLING: ICD-10-CM

## 2021-07-08 DIAGNOSIS — M79.602 LEFT ARM PAIN: Primary | ICD-10-CM

## 2021-07-08 LAB
ALBUMIN SERPL-MCNC: 3.4 G/DL (ref 3.5–5)
ALBUMIN/GLOB SERPL: 1.3 {RATIO} (ref 1.1–2.2)
ALP SERPL-CCNC: 67 U/L (ref 45–117)
ALT SERPL-CCNC: 39 U/L (ref 12–78)
ANION GAP SERPL CALC-SCNC: 6 MMOL/L (ref 5–15)
AST SERPL-CCNC: 21 U/L (ref 15–37)
BASOPHILS # BLD: 0 K/UL (ref 0–0.1)
BASOPHILS NFR BLD: 0 % (ref 0–1)
BILIRUB SERPL-MCNC: 0.3 MG/DL (ref 0.2–1)
BUN SERPL-MCNC: 20 MG/DL (ref 6–20)
BUN/CREAT SERPL: 27 (ref 12–20)
CALCIUM SERPL-MCNC: 9 MG/DL (ref 8.5–10.1)
CHLORIDE SERPL-SCNC: 104 MMOL/L (ref 97–108)
CO2 SERPL-SCNC: 28 MMOL/L (ref 21–32)
CREAT SERPL-MCNC: 0.75 MG/DL (ref 0.55–1.02)
DIFFERENTIAL METHOD BLD: ABNORMAL
EOSINOPHIL # BLD: 0.4 K/UL (ref 0–0.4)
EOSINOPHIL NFR BLD: 8 % (ref 0–7)
ERYTHROCYTE [DISTWIDTH] IN BLOOD BY AUTOMATED COUNT: 12.9 % (ref 11.5–14.5)
GLOBULIN SER CALC-MCNC: 2.7 G/DL (ref 2–4)
GLUCOSE SERPL-MCNC: 118 MG/DL (ref 65–100)
HCT VFR BLD AUTO: 34.5 % (ref 35–47)
HGB BLD-MCNC: 11.4 G/DL (ref 11.5–16)
IMM GRANULOCYTES # BLD AUTO: 0 K/UL
IMM GRANULOCYTES NFR BLD AUTO: 0 %
LYMPHOCYTES # BLD: 1.7 K/UL (ref 0.8–3.5)
LYMPHOCYTES NFR BLD: 32 % (ref 12–49)
MCH RBC QN AUTO: 30.9 PG (ref 26–34)
MCHC RBC AUTO-ENTMCNC: 33 G/DL (ref 30–36.5)
MCV RBC AUTO: 93.5 FL (ref 80–99)
MONOCYTES # BLD: 0.1 K/UL (ref 0–1)
MONOCYTES NFR BLD: 2 % (ref 5–13)
NEUTS SEG # BLD: 3.1 K/UL (ref 1.8–8)
NEUTS SEG NFR BLD: 58 % (ref 32–75)
NRBC # BLD: 0 K/UL (ref 0–0.01)
NRBC BLD-RTO: 0 PER 100 WBC
PLATELET # BLD AUTO: 215 K/UL (ref 150–400)
PMV BLD AUTO: 8.9 FL (ref 8.9–12.9)
POTASSIUM SERPL-SCNC: 3.7 MMOL/L (ref 3.5–5.1)
PROT SERPL-MCNC: 6.1 G/DL (ref 6.4–8.2)
RBC # BLD AUTO: 3.69 M/UL (ref 3.8–5.2)
RBC MORPH BLD: ABNORMAL
SODIUM SERPL-SCNC: 138 MMOL/L (ref 136–145)
TOTAL CELLS COUNTED SPEC: 50
WBC # BLD AUTO: 5.3 K/UL (ref 3.6–11)

## 2021-07-08 PROCEDURE — 85025 COMPLETE CBC W/AUTO DIFF WBC: CPT

## 2021-07-08 PROCEDURE — 36415 COLL VENOUS BLD VENIPUNCTURE: CPT

## 2021-07-08 PROCEDURE — 74011250637 HC RX REV CODE- 250/637: Performed by: EMERGENCY MEDICINE

## 2021-07-08 PROCEDURE — 73060 X-RAY EXAM OF HUMERUS: CPT

## 2021-07-08 PROCEDURE — 99284 EMERGENCY DEPT VISIT MOD MDM: CPT

## 2021-07-08 PROCEDURE — 80053 COMPREHEN METABOLIC PANEL: CPT

## 2021-07-08 RX ORDER — TRAMADOL HYDROCHLORIDE 50 MG/1
50 TABLET ORAL
Status: COMPLETED | OUTPATIENT
Start: 2021-07-09 | End: 2021-07-08

## 2021-07-08 RX ADMIN — TRAMADOL HYDROCHLORIDE 50 MG: 50 TABLET, FILM COATED ORAL at 23:26

## 2021-07-09 ENCOUNTER — APPOINTMENT (OUTPATIENT)
Dept: ULTRASOUND IMAGING | Age: 71
End: 2021-07-09
Attending: EMERGENCY MEDICINE
Payer: MEDICARE

## 2021-07-09 VITALS
RESPIRATION RATE: 16 BRPM | HEART RATE: 77 BPM | WEIGHT: 166.89 LBS | BODY MASS INDEX: 27.77 KG/M2 | SYSTOLIC BLOOD PRESSURE: 131 MMHG | DIASTOLIC BLOOD PRESSURE: 70 MMHG | OXYGEN SATURATION: 93 % | TEMPERATURE: 97.5 F

## 2021-07-09 PROCEDURE — 93971 EXTREMITY STUDY: CPT

## 2021-07-09 RX ORDER — TRAMADOL HYDROCHLORIDE 50 MG/1
50 TABLET ORAL
Qty: 10 TABLET | Refills: 0 | Status: SHIPPED | OUTPATIENT
Start: 2021-07-09 | End: 2021-07-19

## 2021-07-09 NOTE — ED TRIAGE NOTES
Pt arrives with some left arm pain that feels like it's \"in the bones\". Pt reports recently finishing chemo and being concerned over possibility of a blood clot. Temp 97.5.

## 2021-07-09 NOTE — ED PROVIDER NOTES
HPI   69 yo F presents with left arm pain onset today. Denies injury or trauma. On chemo for lymphoma, last treatment 6 weeks ago. No port, was getting infusions through arm. Denies cp/sob. Pain from left arm radiating to left shoulder. Not on blood thinners. Denies weakness or numbness. Past Medical History:   Diagnosis Date    Cancer ([facility]     non hodskins folicular lymphoma    Diffuse large B cell lymphoma ([facility]     Non Hodgkin's lymphoma ([facility]        Past Surgical History:   Procedure Laterality Date    HX GYN      partial hysterectomy    SD BREAST SURGERY PROCEDURE UNLISTED      lump from breast benign         History reviewed. No pertinent family history. Social History     Socioeconomic History    Marital status:      Spouse name: Not on file    Number of children: Not on file    Years of education: Not on file    Highest education level: Not on file   Occupational History    Not on file   Tobacco Use    Smoking status: Former Smoker     Quit date: 3/29/2004     Years since quittin.2    Smokeless tobacco: Never Used   Vaping Use    Vaping Use: Never used   Substance and Sexual Activity    Alcohol use: Yes     Alcohol/week: 3.0 standard drinks     Types: 3 Glasses of wine per week    Drug use: Never    Sexual activity: Not on file   Other Topics Concern    Not on file   Social History Narrative    ** Merged History Encounter **          Social Determinants of Health     Financial Resource Strain:     Difficulty of Paying Living Expenses:    Food Insecurity:     Worried About Running Out of Food in the Last Year:     Ran Out of Food in the Last Year:    Transportation Needs:     Lack of Transportation (Medical):      Lack of Transportation (Non-Medical):    Physical Activity:     Days of Exercise per Week:     Minutes of Exercise per Session:    Stress:     Feeling of Stress :    Social Connections:     Frequency of Communication with Friends and Family:     Frequency of Social Gatherings with Friends and Family:     Attends Holiness Services:     Active Member of Clubs or Organizations:     Attends Club or Organization Meetings:     Marital Status:    Intimate Partner Violence:     Fear of Current or Ex-Partner:     Emotionally Abused:     Physically Abused:     Sexually Abused: ALLERGIES: Niacin preparations; Other plant, animal, environmental; and Niacin    Review of Systems   Constitutional: Negative for chills and fever. Respiratory: Negative for cough and shortness of breath. Cardiovascular: Negative for chest pain. Gastrointestinal: Negative for abdominal pain, diarrhea, nausea and vomiting. Musculoskeletal: Positive for myalgias. Negative for neck pain and neck stiffness. Skin: Positive for rash. Negative for wound. Neurological: Negative for weakness and numbness. All other systems reviewed and are negative.       Vitals:    07/08/21 2257   BP: (!) 162/79   Pulse: 77   Resp: 16   Temp: 97.5 °F (36.4 °C)   SpO2: 94%   Weight: 75.7 kg (166 lb 14.2 oz)            Physical Exam   Physical Examination: General appearance - alert, well appearing, and in no distress, oriented to person, place, and time and normal appearing weight  Eyes - pupils equal and reactive, extraocular eye movements intact  Neck - supple, no significant adenopathy  Chest - clear to auscultation, no wheezes, rales or rhonchi, symmetric air entry  Heart - normal rate, regular rhythm, normal S1, S2, no murmurs, rubs, clicks or gallops  Abdomen - soft, nontender, nondistended, no masses or organomegaly  Back exam - full range of motion, no tenderness, palpable spasm or pain on motion  Neurological - alert, oriented, normal speech, no focal findings or movement disorder noted  Musculoskeletal - no joint tenderness, deformity or swelling  Extremities - peripheral pulses normal, no pedal edema, no clubbing or cyanosis, focal swelling to left distal humerus medially with prominent vein extending up toward the axilla, no palpable cords, neurovascularly intact with strong radial pulses on the left, full range of motion of left shoulder and elbow  Skin - normal coloration and turgor, no rashes, no suspicious skin lesions noted  MDM  Number of Diagnoses or Management Options     Amount and/or Complexity of Data Reviewed  Clinical lab tests: ordered and reviewed  Tests in the radiology section of CPT®: ordered and reviewed  Decide to obtain previous medical records or to obtain history from someone other than the patient: yes  Obtain history from someone other than the patient: yes (spouse)  Review and summarize past medical records: yes  Independent visualization of images, tracings, or specimens: yes    Patient Progress  Patient progress: improved         Procedures    Patient with small area of focal swelling to left distal humerus medially. X-ray and ultrasound without acute process and no DVT. Labs unremarkable. Patient denies chest pain or shortness of breath. Will discharge with PCP follow-up or return to ED for worsening symptoms. Pain improved with Ultram given in ED. Patient is scheduled for PET scan next week.

## 2021-07-09 NOTE — DISCHARGE INSTRUCTIONS
We hope that we have addressed all of your medical concerns. The examination and treatment you received in the Emergency Department were for an emergent problem and were not intended as complete care. It is important that you follow up with your healthcare provider(s) for ongoing care. If your symptoms worsen or do not improve as expected, and you are unable to reach your usual health care provider(s), you should return to the Emergency Department. Today's healthcare is undergoing tremendous change, and patient satisfaction surveys are one of the many tools to assess the quality of medical care. You may receive a survey from the [employer] regarding your experience in the Emergency Department. I hope that your experience has been completely positive, particularly the medical care that I provided. As such, please participate in the survey; anything less than excellent does not meet my expectations or intentions. Formerly [facility] and [facility] participate in nationally recognized quality of care measures. If your blood pressure is greater than 120/80, as reported below, we urge that you seek medical care to address the potential of high blood pressure, commonly known as hypertension. Hypertension can be hereditary or can be caused by certain medical conditions, pain, stress, or \"white coat syndrome. \"       Please make an appointment with your health care provider(s) for follow up of your Emergency Department visit.        VITALS:   Patient Vitals for the past 8 hrs:   Temp Pulse Resp BP SpO2   07/09/21 0100 -- -- -- 131/70 93 %   07/09/21 0046 -- -- -- 126/69 94 %   07/09/21 0030 -- -- -- (!) 154/84 91 %   07/09/21 0015 -- -- -- 138/76 91 %   07/09/21 0000 -- -- -- (!) 146/85 93 %   07/08/21 2345 -- -- -- (!) 147/76 94 %   07/08/21 2315 -- -- -- (!) 162/84 94 %   07/08/21 2257 97.5 °F (36.4 °C) 77 16 (!) 162/79 94 %          Thank you for allowing us to provide you with medical care today. We realize that you have many choices for your emergency care needs. Please choose us in the future for any continued health care needs. Ely Johnson, [address] [place], [place] [address].   Office: [phone]            Recent Results (from the past 24 hour(s))   CBC WITH AUTOMATED DIFF    Collection Time: 07/08/21 11:27 PM   Result Value Ref Range    WBC 5.3 3.6 - 11.0 K/uL    RBC 3.69 (L) 3.80 - 5.20 M/uL    HGB 11.4 (L) 11.5 - 16.0 g/dL    HCT 34.5 (L) 35.0 - 47.0 %    MCV 93.5 80.0 - 99.0 FL    MCH 30.9 26.0 - 34.0 PG    MCHC 33.0 30.0 - 36.5 g/dL    RDW 12.9 11.5 - 14.5 %    PLATELET 713 750 - 312 K/uL    MPV 8.9 8.9 - 12.9 FL    NRBC 0.0 0  WBC    ABSOLUTE NRBC 0.00 0.00 - 0.01 K/uL    NEUTROPHILS 58 32 - 75 %    LYMPHOCYTES 32 12 - 49 %    MONOCYTES 2 (L) 5 - 13 %    EOSINOPHILS 8 (H) 0 - 7 %    BASOPHILS 0 0 - 1 %    IMMATURE GRANULOCYTES 0 %    TOTAL CELLS COUNTED 50      ABS. NEUTROPHILS 3.1 1.8 - 8.0 K/UL    ABS. LYMPHOCYTES 1.7 0.8 - 3.5 K/UL    ABS. MONOCYTES 0.1 0.0 - 1.0 K/UL    ABS. EOSINOPHILS 0.4 0.0 - 0.4 K/UL    ABS. BASOPHILS 0.0 0.0 - 0.1 K/UL    ABS. IMM. GRANS. 0.0 K/UL    DF MANUAL      RBC COMMENTS NORMOCYTIC, NORMOCHROMIC     METABOLIC PANEL, COMPREHENSIVE    Collection Time: 07/08/21 11:27 PM   Result Value Ref Range    Sodium 138 136 - 145 mmol/L    Potassium 3.7 3.5 - 5.1 mmol/L    Chloride 104 97 - 108 mmol/L    CO2 28 21 - 32 mmol/L    Anion gap 6 5 - 15 mmol/L    Glucose 118 (H) 65 - 100 mg/dL    BUN 20 6 - 20 MG/DL    Creatinine 0.75 0.55 - 1.02 MG/DL    BUN/Creatinine ratio 27 (H) 12 - 20      GFR est AA >60 >60 ml/min/1.73m2    GFR est non-AA >60 >60 ml/min/1.73m2    Calcium 9.0 8.5 - 10.1 MG/DL    Bilirubin, total 0.3 0.2 - 1.0 MG/DL    ALT (SGPT) 39 12 - 78 U/L    AST (SGOT) 21 15 - 37 U/L    Alk.  phosphatase 67 45 - 117 U/L    Protein, total 6.1 (L) 6.4 - 8.2 g/dL    Albumin 3.4 (L) 3.5 - 5.0 g/dL    Globulin 2.7 2.0 - 4.0 g/dL    A-G Ratio 1.3 1.1 - 2.2         XR HUMERUS LT    Result Date: 7/9/2021  INDICATION:   swelling, pain COMPARISON: None FINDINGS: 2 views of the left humerus demonstrate no acute fracture or significant soft tissue abnormality. No radiopaque foreign body. No acute fracture. DUPLEX UPPER EXT VENOUS LEFT    Result Date: 7/9/2021  · No evidence of acute DVT and chronic DVT in the left upper extremity.

## 2021-07-27 ENCOUNTER — OFFICE VISIT (OUTPATIENT)
Dept: PRIMARY CARE CLINIC | Age: 71
End: 2021-07-27
Payer: MEDICARE

## 2021-07-27 VITALS
OXYGEN SATURATION: 95 % | BODY MASS INDEX: 26.76 KG/M2 | DIASTOLIC BLOOD PRESSURE: 70 MMHG | SYSTOLIC BLOOD PRESSURE: 107 MMHG | TEMPERATURE: 97.8 F | WEIGHT: 160.6 LBS | HEART RATE: 82 BPM | HEIGHT: 65 IN | RESPIRATION RATE: 15 BRPM

## 2021-07-27 DIAGNOSIS — E78.5 HYPERLIPIDEMIA, UNSPECIFIED HYPERLIPIDEMIA TYPE: ICD-10-CM

## 2021-07-27 DIAGNOSIS — C80.1 CANCER (HCC): ICD-10-CM

## 2021-07-27 DIAGNOSIS — F41.8 ANXIETY ABOUT HEALTH: Primary | ICD-10-CM

## 2021-07-27 DIAGNOSIS — Z76.0 MEDICATION REFILL: ICD-10-CM

## 2021-07-27 PROCEDURE — 3017F COLORECTAL CA SCREEN DOC REV: CPT | Performed by: NURSE PRACTITIONER

## 2021-07-27 PROCEDURE — G8432 DEP SCR NOT DOC, RNG: HCPCS | Performed by: NURSE PRACTITIONER

## 2021-07-27 PROCEDURE — G8427 DOCREV CUR MEDS BY ELIG CLIN: HCPCS | Performed by: NURSE PRACTITIONER

## 2021-07-27 PROCEDURE — G8400 PT W/DXA NO RESULTS DOC: HCPCS | Performed by: NURSE PRACTITIONER

## 2021-07-27 PROCEDURE — G8536 NO DOC ELDER MAL SCRN: HCPCS | Performed by: NURSE PRACTITIONER

## 2021-07-27 PROCEDURE — 1101F PT FALLS ASSESS-DOCD LE1/YR: CPT | Performed by: NURSE PRACTITIONER

## 2021-07-27 PROCEDURE — 1090F PRES/ABSN URINE INCON ASSESS: CPT | Performed by: NURSE PRACTITIONER

## 2021-07-27 PROCEDURE — G8419 CALC BMI OUT NRM PARAM NOF/U: HCPCS | Performed by: NURSE PRACTITIONER

## 2021-07-27 PROCEDURE — 99213 OFFICE O/P EST LOW 20 MIN: CPT | Performed by: NURSE PRACTITIONER

## 2021-07-27 RX ORDER — ALPRAZOLAM 0.25 MG/1
TABLET ORAL
Qty: 30 TABLET | Refills: 0 | Status: CANCELLED | OUTPATIENT
Start: 2021-07-27

## 2021-07-27 RX ORDER — SIMVASTATIN 40 MG/1
TABLET, FILM COATED ORAL
Qty: 90 TABLET | Refills: 0 | Status: SHIPPED | OUTPATIENT
Start: 2021-07-27 | End: 2021-09-15 | Stop reason: SDUPTHER

## 2021-07-27 RX ORDER — CHOLECALCIFEROL (VITAMIN D3) 50 MCG
CAPSULE ORAL
COMMUNITY

## 2021-07-27 RX ORDER — ALPRAZOLAM 0.25 MG/1
TABLET ORAL
Qty: 30 TABLET | Refills: 0 | Status: SHIPPED | OUTPATIENT
Start: 2021-07-27

## 2021-07-27 NOTE — PROGRESS NOTES
Chief Complaint   Patient presents with    Medication Refill     medication refill, lab results where just done       Visit Vitals  /70 (BP 1 Location: Right arm)   Pulse 82   Temp 97.8 °F (36.6 °C)   Resp 15   Ht 5' 5\" (1.651 m)   Wt 160 lb 9.6 oz (72.8 kg)   SpO2 95%   BMI 26.73 kg/m²       1. Have you been to the ER, urgent care clinic since your last visit? Hospitalized since your last visit? Yes [place] [facility]- lump on arm- ultra sound done    2. Have you seen or consulted any other health care providers outside of the [address] [place] [address] since your last visit? Include any pap smears or colon screening.  Yes [facility] Dr Sylvia Rosa

## 2021-07-27 NOTE — PROGRESS NOTES
Tom Brito is a  70 y.o. female presents for visit. Chief Complaint   Patient presents with    Medication Refill     medication refill, lab results where just done     HPI  Presents for medication refills. Requests prescription for Xanax for anxiety and simvastatin for hyperlipidemia. Feeling anxious about health. Just completed chemotherapy 8 weeks ago. [place] the beach [place] 1 [place]. Easily fatigued. Taking Claritin for bone pain resulting from neulasta. .- found it effective. Non-hodgkins lymphoma. Review of Systems   Constitutional: Negative for chills and fever. Cardiovascular: Negative for chest pain and palpitations. Psychiatric/Behavioral: The patient is nervous/anxious.          Past Medical History:   Diagnosis Date    Cancer ([facility]     non hodskins folicular lymphoma    Diffuse large B cell lymphoma (HCC)     Non Hodgkin's lymphoma ([facility]     Non Hodgkin's lymphoma ([facility]     Large B-cell     Past Surgical History:   Procedure Laterality Date    HX GYN      partial hysterectomy    MS BREAST SURGERY PROCEDURE UNLISTED      lump from breast benign       Social History     Socioeconomic History    Marital status:      Spouse name: Not on file    Number of children: Not on file    Years of education: Not on file    Highest education level: Not on file   Occupational History    Not on file   Tobacco Use    Smoking status: Former Smoker     Quit date: 3/29/2004     Years since quittin.3    Smokeless tobacco: Never Used   Vaping Use    Vaping Use: Never used   Substance and Sexual Activity    Alcohol use: Not Currently     Alcohol/week: 3.0 standard drinks     Types: 3 Glasses of wine per week    Drug use: Never    Sexual activity: Not Currently   Other Topics Concern    Not on file   Social History Narrative    ** Merged History Encounter **          Social Determinants of Health     Financial Resource Strain:     Difficulty of Paying Living Expenses:    Food Insecurity:     Worried About Running Out of Food in the Last Year:     [address] in the Last Year:    Transportation Needs:     Lack of Transportation (Medical):  Lack of Transportation (Non-Medical):    Physical Activity:     Days of Exercise per Week:     Minutes of Exercise per Session:    Stress:     Feeling of Stress :    Social Connections:     Frequency of Communication with Friends and Family:     Frequency of Social Gatherings with Friends and Family:     Attends Quaker Services:     Active Member of Clubs or Organizations:     Attends Club or Organization Meetings:     Marital Status:    Intimate Partner Violence:     Fear of Current or Ex-Partner:     Emotionally Abused:     Physically Abused:     Sexually Abused:        History reviewed. No pertinent family history. Prior to Admission medications    Medication Sig Start Date End Date Taking? Authorizing Provider   omega 3-dha-epa-fish oil (Fish OiL) 100-160-1,000 mg cap Take  by mouth. Yes Provider, Historical   simvastatin (ZOCOR) 40 mg tablet Take 1 tablet by mouth nightly. 7/27/21  Yes Jeffrey Fitzpatrick NP   ALPRAZolam [place]) 0.25 mg tablet alprazolam 0.25 mg tablet  TAKE 1 TABLET BY MOUTH TWICE A DAY AS NEEDED  Indications: anxious 7/27/21  Yes Jeffrey Fitzpatrick NP   loratadine (Claritin) 10 mg tablet Take 10 mg by mouth. Yes Provider, Historical   estradiol acetate 0.05 mg/24 hr ring Insert 1 ring vaginally q 3 months. 1/2/20  Yes Kody Ellison NP   biotin 100 mg/gram powd Take 10,000 mcg by mouth. Yes Provider, Historical   cholecalciferol, vitamin D3, (VITAMIN D3) 2,000 unit tab Take  by mouth. Yes Provider, Historical   coenzyme q10 (CO Q-10) 10 mg cap Take 10 mg by mouth daily. Yes Provider, Historical   Biotin 2,500 mcg cap Take 2,500 mg by mouth. Yes Other, MD Rakan   aspirin delayed-release 81 mg tablet Take 81 mg by mouth daily.    Yes Mila, MD Rakan   cholecalciferol, VITAMIN D3, (VITAMIN D3) 5,000 unit tab tablet Take 5,000 Units by mouth daily. Indications: takes 10,000U   Yes Other, MD Rakan   simvastatin (ZOCOR) 40 mg tablet Take 1 tablet by mouth nightly. 6/15/21 7/27/21  Gm Hansen DO   pantoprazole (PROTONIX) 40 mg tablet Take 1 Tab by mouth daily. 4/26/21 7/27/21  Delta DO Jade   famotidine (PEPCID) 20 mg tablet Take 1 Tab by mouth nightly. 4/26/21 7/27/21  Delta DO Jade   ALPRAZolam [place]a [place]) 0.25 mg tablet alprazolam 0.25 mg tablet  TAKE 1 TABLET BY MOUTH TWICE A DAY AS NEEDED 2/19/21 7/27/21  Lemoyne Bis, NP   ondansetron (ZOFRAN ODT) 4 mg disintegrating tablet Take 1 Tab by mouth every eight (8) hours as needed for Nausea or Vomiting. 5/15/20 7/27/21  Lemoyne Bis, NP   cetirizine (ZYRTEC) 10 mg tablet Take 1 Tab by mouth nightly. 3/29/19   Lemoyne Bis, NP   fluticasone propionate (FLONASE) 50 mcg/actuation nasal spray Take as directed. Patient not taking: Reported on 7/27/2021 3/29/19 7/27/21  Garcia Bis, NP      Allergies   Allergen Reactions    Niacin Preparations Rash and Swelling    Other Plant, Animal, Environmental Hives     metals    Niacin Angioedema, Rash and Swelling        Visit Vitals  /70 (BP 1 Location: Right arm)   Pulse 82   Temp 97.8 °F (36.6 °C)   Resp 15   Ht 5' 5\" (1.651 m)   Wt 160 lb 9.6 oz (72.8 kg)   SpO2 95%   BMI 26.73 kg/m²     Physical Exam  Vitals and nursing note reviewed. Constitutional:       General: She is not in acute distress. Appearance: Normal appearance. She is well-developed. HENT:      Head: Normocephalic and atraumatic. Right Ear: External ear normal.      Left Ear: External ear normal.      Nose: Nose normal.   Eyes:      Conjunctiva/sclera: Conjunctivae normal.   Cardiovascular:      Rate and Rhythm: Normal rate and regular rhythm. Heart sounds: Normal heart sounds.    Pulmonary:      Effort: Pulmonary effort is normal.      Breath sounds: Normal breath sounds. No wheezing. Skin:     General: Skin is warm and dry. Neurological:      Mental Status: She is alert and oriented to person, place, and time. Psychiatric:         Mood and Affect: Mood is anxious. Speech: Speech normal.         Behavior: Behavior normal.               No results found for this or any previous visit (from the past 24 hour(s)). There are no problems to display for this patient. ASSESSMENT AND PLAN:      ICD-10-CM ICD-9-CM   1. Anxiety about health  F41.8 300.09   2. Hyperlipidemia, unspecified hyperlipidemia type  E78.5 272.4   3. Cancer (HCC)  C80.1 199.1   4. Medication refill  Z76.0 V68.1     Orders Placed This Encounter    omega 3-dha-epa-fish oil (Fish OiL) 100-160-1,000 mg cap     Sig: Take  by mouth.  simvastatin (ZOCOR) 40 mg tablet     Sig: Take 1 tablet by mouth nightly. Dispense:  90 Tablet     Refill:  0    ALPRAZolam (XANAX) 0.25 mg tablet     Sig: alprazolam 0.25 mg tablet  TAKE 1 TABLET BY MOUTH TWICE A DAY AS NEEDED  Indications: anxious     Dispense:  30 Tablet     Refill:  0     Diagnoses and all orders for this visit:    1. Anxiety about health  -     ALPRAZolam (XANAX) 0.25 mg tablet; alprazolam 0.25 mg tablet  TAKE 1 TABLET BY MOUTH TWICE A DAY AS NEEDED  Indications: anxious        -     Interested in counseling for cancer support. 2. Hyperlipidemia, unspecified hyperlipidemia type  -     simvastatin (ZOCOR) 40 mg tablet; Take 1 tablet by mouth nightly. 3. [facility] ([facility]      Followed by oncology. 4. Medication refill        lab results and schedule of future lab studies reviewed with patient        Follow-up and Dispositions    · Return in about 6 weeks (around 9/7/2021), or if symptoms worsen or fail to improve, for chronic care. Disclaimer:  Advised her to call back or return to office if symptoms worsen/change/persist.  Discussed expected course/resolution/complications of diagnosis in detail with patient.     Medication risks/benefits/alternatives discussed with patient. She was given an after visit summary which includes diagnoses, current medications, & vitals. Discussed patient instructions and advised to read to all patient instructions regarding care. She expressed understanding with the diagnosis and plan.

## 2021-09-15 DIAGNOSIS — E78.5 HYPERLIPIDEMIA, UNSPECIFIED HYPERLIPIDEMIA TYPE: ICD-10-CM

## 2021-09-15 DIAGNOSIS — F41.8 ANXIETY ABOUT HEALTH: ICD-10-CM

## 2021-09-16 RX ORDER — ALPRAZOLAM 0.25 MG/1
TABLET ORAL
Qty: 30 TABLET | Refills: 0 | OUTPATIENT
Start: 2021-09-16

## 2021-09-16 NOTE — TELEPHONE ENCOUNTER
Requested Prescriptions     Pending Prescriptions Disp Refills    ALPRAZolam (XANAX) 0.25 mg tablet 30 Tablet 0     Sig: alprazolam 0.25 mg tablet  TAKE 1 TABLET BY MOUTH TWICE A DAY AS NEEDED  Indications: anxious    simvastatin (ZOCOR) 40 mg tablet 90 Tablet 0     Sig: Take 1 tablet by mouth nightly. Last office visit: 7/27/2021 with Phil  Last refill: simvastatin 7/27/2021    Called pt to inform that Dr Ghada uGzman would not be able to prescribe alprazolam without seeing pt. Pt requested a virtual visit due to currently receiving tx for lymphoma and does not want to get sick. Pt would like to be called back if we can schedule her a virtual visit for her medication refill.

## 2021-09-17 RX ORDER — SIMVASTATIN 40 MG/1
TABLET, FILM COATED ORAL
Qty: 90 TABLET | Refills: 0 | Status: SHIPPED | OUTPATIENT
Start: 2021-09-17 | End: 2022-01-10 | Stop reason: SDUPTHER

## 2022-01-10 DIAGNOSIS — E78.5 HYPERLIPIDEMIA, UNSPECIFIED HYPERLIPIDEMIA TYPE: ICD-10-CM

## 2022-01-10 RX ORDER — SIMVASTATIN 40 MG/1
TABLET, FILM COATED ORAL
Qty: 90 TABLET | Refills: 0 | Status: SHIPPED | OUTPATIENT
Start: 2022-01-10

## 2022-03-03 DIAGNOSIS — R59.0 AXILLARY LYMPHADENOPATHY: Primary | ICD-10-CM

## 2022-03-03 NOTE — PROGRESS NOTES
Left a voicemail in regards to patient's recent Mammogram results. Results showed Bilateral Axillary Lymphadenopathy. Patient has a PMH of Lymphoma. Ultrasound of Bilateral Axilla placed. Awaiting call back from patient to further investigate.

## 2022-03-08 ENCOUNTER — TELEPHONE (OUTPATIENT)
Dept: PRIMARY CARE CLINIC | Age: 72
End: 2022-03-08

## 2022-03-08 NOTE — TELEPHONE ENCOUNTER
This pt called to office stating that a person by the name of Betty Antonio called her in left a message to call about Mammo results. Pt states she feels message was left for wrong person as she use to be a pt of Dr. Ellie Saenz but is not a pt here anymore.  A Team message was sent to (NP Jairo Quijano and nurse Maira Matos) as a Ashlee Rubin

## 2022-04-13 ENCOUNTER — APPOINTMENT (OUTPATIENT)
Dept: CT IMAGING | Age: 72
End: 2022-04-13
Attending: EMERGENCY MEDICINE
Payer: MEDICARE

## 2022-04-13 ENCOUNTER — HOSPITAL ENCOUNTER (EMERGENCY)
Age: 72
Discharge: HOME | End: 2022-04-13
Attending: EMERGENCY MEDICINE
Payer: MEDICARE

## 2022-04-13 VITALS
OXYGEN SATURATION: 97 % | HEART RATE: 65 BPM | TEMPERATURE: 98.1 F | RESPIRATION RATE: 16 BRPM | BODY MASS INDEX: 27.88 KG/M2 | SYSTOLIC BLOOD PRESSURE: 166 MMHG | WEIGHT: 167.55 LBS | DIASTOLIC BLOOD PRESSURE: 83 MMHG

## 2022-04-13 DIAGNOSIS — R10.9 FLANK PAIN: Primary | ICD-10-CM

## 2022-04-13 LAB
ALBUMIN SERPL-MCNC: 3.5 G/DL (ref 3.5–5)
ALBUMIN/GLOB SERPL: 1.1 {RATIO} (ref 1.1–2.2)
ALP SERPL-CCNC: 103 U/L (ref 45–117)
ALT SERPL-CCNC: 22 U/L (ref 12–78)
ANION GAP SERPL CALC-SCNC: 9 MMOL/L (ref 5–15)
APPEARANCE UR: ABNORMAL
AST SERPL-CCNC: 16 U/L (ref 15–37)
BACTERIA URNS QL MICRO: NEGATIVE /HPF
BASOPHILS # BLD: 0 K/UL (ref 0–0.1)
BASOPHILS NFR BLD: 1 % (ref 0–1)
BILIRUB SERPL-MCNC: 0.4 MG/DL (ref 0.2–1)
BILIRUB UR QL: NEGATIVE
BUN SERPL-MCNC: 16 MG/DL (ref 6–20)
BUN/CREAT SERPL: 18 (ref 12–20)
CALCIUM SERPL-MCNC: 8.7 MG/DL (ref 8.5–10.1)
CAOX CRY URNS QL MICRO: ABNORMAL
CHLORIDE SERPL-SCNC: 103 MMOL/L (ref 97–108)
CO2 SERPL-SCNC: 28 MMOL/L (ref 21–32)
COLOR UR: ABNORMAL
CREAT SERPL-MCNC: 0.87 MG/DL (ref 0.55–1.02)
DIFFERENTIAL METHOD BLD: NORMAL
EOSINOPHIL # BLD: 0.2 K/UL (ref 0–0.4)
EOSINOPHIL NFR BLD: 3 % (ref 0–7)
EPITH CASTS URNS QL MICRO: ABNORMAL /LPF
ERYTHROCYTE [DISTWIDTH] IN BLOOD BY AUTOMATED COUNT: 13.3 % (ref 11.5–14.5)
GLOBULIN SER CALC-MCNC: 3.1 G/DL (ref 2–4)
GLUCOSE SERPL-MCNC: 99 MG/DL (ref 65–100)
GLUCOSE UR STRIP.AUTO-MCNC: NEGATIVE MG/DL
HCT VFR BLD AUTO: 39 % (ref 35–47)
HGB BLD-MCNC: 12.7 G/DL (ref 11.5–16)
HGB UR QL STRIP: NEGATIVE
IMM GRANULOCYTES # BLD AUTO: 0 K/UL (ref 0–0.04)
IMM GRANULOCYTES NFR BLD AUTO: 0 % (ref 0–0.5)
KETONES UR QL STRIP.AUTO: NEGATIVE MG/DL
LEUKOCYTE ESTERASE UR QL STRIP.AUTO: NEGATIVE
LIPASE SERPL-CCNC: 96 U/L (ref 73–393)
LYMPHOCYTES # BLD: 1.5 K/UL (ref 0.8–3.5)
LYMPHOCYTES NFR BLD: 25 % (ref 12–49)
MCH RBC QN AUTO: 30 PG (ref 26–34)
MCHC RBC AUTO-ENTMCNC: 32.6 G/DL (ref 30–36.5)
MCV RBC AUTO: 92 FL (ref 80–99)
MONOCYTES # BLD: 0.5 K/UL (ref 0–1)
MONOCYTES NFR BLD: 9 % (ref 5–13)
MUCOUS THREADS URNS QL MICRO: ABNORMAL /LPF
NEUTS SEG # BLD: 3.7 K/UL (ref 1.8–8)
NEUTS SEG NFR BLD: 62 % (ref 32–75)
NITRITE UR QL STRIP.AUTO: NEGATIVE
NRBC # BLD: 0 K/UL (ref 0–0.01)
NRBC BLD-RTO: 0 PER 100 WBC
PH UR STRIP: 6.5 [PH] (ref 5–8)
PLATELET # BLD AUTO: 298 K/UL (ref 150–400)
PMV BLD AUTO: 8.9 FL (ref 8.9–12.9)
POTASSIUM SERPL-SCNC: 4 MMOL/L (ref 3.5–5.1)
PROT SERPL-MCNC: 6.6 G/DL (ref 6.4–8.2)
PROT UR STRIP-MCNC: NEGATIVE MG/DL
RBC # BLD AUTO: 4.24 M/UL (ref 3.8–5.2)
RBC #/AREA URNS HPF: ABNORMAL /HPF (ref 0–5)
SODIUM SERPL-SCNC: 140 MMOL/L (ref 136–145)
SP GR UR REFRACTOMETRY: 1.02 (ref 1–1.03)
UA: UC IF INDICATED,UAUC: ABNORMAL
UROBILINOGEN UR QL STRIP.AUTO: 0.2 EU/DL (ref 0.2–1)
WBC # BLD AUTO: 5.9 K/UL (ref 3.6–11)
WBC URNS QL MICRO: ABNORMAL /HPF (ref 0–4)

## 2022-04-13 PROCEDURE — 74011000636 HC RX REV CODE- 636: Performed by: EMERGENCY MEDICINE

## 2022-04-13 PROCEDURE — 36415 COLL VENOUS BLD VENIPUNCTURE: CPT

## 2022-04-13 PROCEDURE — 74177 CT ABD & PELVIS W/CONTRAST: CPT

## 2022-04-13 PROCEDURE — 81001 URINALYSIS AUTO W/SCOPE: CPT

## 2022-04-13 PROCEDURE — 80053 COMPREHEN METABOLIC PANEL: CPT

## 2022-04-13 PROCEDURE — 71275 CT ANGIOGRAPHY CHEST: CPT

## 2022-04-13 PROCEDURE — 85025 COMPLETE CBC W/AUTO DIFF WBC: CPT

## 2022-04-13 PROCEDURE — 74011250636 HC RX REV CODE- 250/636: Performed by: EMERGENCY MEDICINE

## 2022-04-13 PROCEDURE — 93005 ELECTROCARDIOGRAM TRACING: CPT

## 2022-04-13 PROCEDURE — 83690 ASSAY OF LIPASE: CPT

## 2022-04-13 PROCEDURE — 99285 EMERGENCY DEPT VISIT HI MDM: CPT

## 2022-04-13 PROCEDURE — 96374 THER/PROPH/DIAG INJ IV PUSH: CPT

## 2022-04-13 RX ORDER — TRAMADOL HYDROCHLORIDE 50 MG/1
50 TABLET ORAL
Qty: 12 TABLET | Refills: 0 | Status: SHIPPED | OUTPATIENT
Start: 2022-04-13 | End: 2022-04-18

## 2022-04-13 RX ORDER — MORPHINE SULFATE 2 MG/ML
2 INJECTION, SOLUTION INTRAMUSCULAR; INTRAVENOUS ONCE
Status: COMPLETED | OUTPATIENT
Start: 2022-04-13 | End: 2022-04-13

## 2022-04-13 RX ADMIN — IOPAMIDOL 100 ML: 755 INJECTION, SOLUTION INTRAVENOUS at 14:22

## 2022-04-13 RX ADMIN — MORPHINE SULFATE 2 MG: 2 INJECTION, SOLUTION INTRAMUSCULAR; INTRAVENOUS at 15:16

## 2022-04-13 NOTE — ED PROVIDER NOTES
HPI   The patient is a 49-year-old white female with a history of non-Hodgkin's follicular lymphoma that had morphed into a diffuse large cell B-cell lymphoma as well. This was aggressively treated with chemotherapy approximately 1 year prior to admission and the aggressive lymphoma went into full remission. Today she complains of several days of right sided chest and back pain which was not particularly pleuritic or movement associated. It is intermittent in nature and becomes quite severe. She denies shortness of breath. She denies any urinary area symptoms or any history of kidney stones. She denies fever and chills. Past Medical History:   Diagnosis Date    Cancer ([facility]     non hodskins folicular lymphoma    Diffuse large B cell lymphoma ([facility]     Non Hodgkin's lymphoma ([facility]     Non Hodgkin's lymphoma ([facility]     Large B-cell       Past Surgical History:   Procedure Laterality Date    HX GYN      partial hysterectomy    [place] BREAST SURGERY PROCEDURE UNLISTED      lump from breast benign         History reviewed. No pertinent family history.     Social History     Socioeconomic History    Marital status:      Spouse name: Not on file    Number of children: Not on file    Years of education: Not on file    Highest education level: Not on file   Occupational History    Not on file   Tobacco Use    Smoking status: Former Smoker     Quit date: 3/29/2004     Years since quittin.0    Smokeless tobacco: Never Used   Vaping Use    Vaping Use: Never used   Substance and Sexual Activity    Alcohol use: Not Currently     Alcohol/week: 3.0 standard drinks     Types: 3 Glasses of wine per week    Drug use: Never    Sexual activity: Not Currently   Other Topics Concern    Not on file   Social History Narrative    ** Merged History Encounter **          Social Determinants of Health     Financial Resource Strain:     Difficulty of Paying Living Expenses: Not on file   Food Insecurity:     Worried About [address] in the Last Year: Not on file    Susannah of Food in the Last Year: Not on file   Transportation Needs:     Lack of Transportation (Medical): Not on file    Lack of Transportation (Non-Medical): Not on file   Physical Activity:     Days of Exercise per Week: Not on file    Minutes of Exercise per Session: Not on file   Stress:     Feeling of Stress : Not on file   Social Connections:     Frequency of Communication with Friends and Family: Not on file    Frequency of Social Gatherings with Friends and Family: Not on file    Attends Rastafari Services: Not on file    Active Member of [address] [place] [address] or Organizations: Not on file    Attends Club or Organization Meetings: Not on file    Marital Status: Not on file   Intimate Partner Violence:     Fear of Current or Ex-Partner: Not on file    Emotionally Abused: Not on file    Physically Abused: Not on file    Sexually Abused: Not on file   Housing Stability:     Unable to Pay for Housing in the Last Year: Not on file    Number of [place] in the Last Year: Not on file    Unstable Housing in the Last Year: Not on file         ALLERGIES: Niacin preparations; Other plant, animal, environmental; and Niacin    Review of Systems   All other systems reviewed and are negative. Vitals:    04/13/22 1253   BP: (!) 156/78   Pulse: 81   Resp: 16   Temp: 98 °F (36.7 °C)   SpO2: 97%   Weight: 76 kg (167 lb 8.8 oz)            Physical Exam  Vitals and nursing note reviewed. Constitutional:       Appearance: She is well-developed. HENT:      Head: Normocephalic and atraumatic. Mouth/Throat:      Pharynx: No oropharyngeal exudate. Eyes:      General: No scleral icterus. Conjunctiva/sclera: Conjunctivae normal.   Neck:      Thyroid: No thyromegaly. Cardiovascular:      Rate and Rhythm: Normal rate and regular rhythm. Heart sounds: Normal heart sounds. No murmur heard. No friction rub. No gallop.     Pulmonary: Effort: Pulmonary effort is normal. No respiratory distress. Breath sounds: Normal breath sounds. No stridor. No wheezing or rales. Abdominal:      General: Bowel sounds are normal.      Palpations: Abdomen is soft. Tenderness: There is no abdominal tenderness. There is no guarding or rebound. Musculoskeletal:         General: Normal range of motion. Cervical back: Neck supple. Lymphadenopathy:      Cervical: No cervical adenopathy. Skin:     General: Skin is warm and dry. Neurological:      Mental Status: She is alert and oriented to person, place, and time. MDM  Number of Diagnoses or Management Options     Amount and/or Complexity of Data Reviewed  Clinical lab tests: ordered and reviewed  Tests in the radiology section of CPT®: ordered and reviewed  Tests in the medicine section of CPT®: ordered and reviewed    Risk of Complications, Morbidity, and/or Mortality  Presenting problems: moderate  Diagnostic procedures: moderate  Management options: moderate           Procedures      ED EKG interpretation:  Rhythm: nsr rate 74 no st changes  This EKG was interpreted by Krsytal Huff MD,ED Provider. Assessment and plan patient has right lateral chest pain of uncertain etiology no sign of pulmonary embolus or any cardiac issue, CT reveals no acute findings but adenopathy consistent with patient's diagnosis of lymphoma. Have recommended to her that she have the CT compared to one in January by her oncologist and will be given a copy of the CT.   She may have shingles that has not emerged clinically yet we will treat with Ultram with close follow-up

## 2022-04-13 NOTE — ED TRIAGE NOTES
Pt arrive with R mid back pain x 2-3 days, radiating around to the R side of her ribcage. Non reproducible with movement. PO flexeril has not helped.

## 2022-04-14 LAB
ATRIAL RATE: 74 BPM
CALCULATED P AXIS, ECG09: 37 DEGREES
CALCULATED R AXIS, ECG10: 13 DEGREES
CALCULATED T AXIS, ECG11: 22 DEGREES
DIAGNOSIS, 93000: NORMAL
P-R INTERVAL, ECG05: 166 MS
Q-T INTERVAL, ECG07: 396 MS
QRS DURATION, ECG06: 76 MS
QTC CALCULATION (BEZET), ECG08: 439 MS
VENTRICULAR RATE, ECG03: 74 BPM

## 2023-11-22 ENCOUNTER — HOSPITAL ENCOUNTER (OUTPATIENT)
Facility: HOSPITAL | Age: 73
Discharge: HOME | End: 2023-11-25
Attending: OTOLARYNGOLOGY
Payer: MEDICARE

## 2023-11-22 DIAGNOSIS — J32.0 CHRONIC MAXILLARY SINUSITIS: ICD-10-CM

## 2023-11-22 PROCEDURE — 70486 CT MAXILLOFACIAL W/O DYE: CPT

## 2024-05-09 ENCOUNTER — HOSPITAL ENCOUNTER (EMERGENCY)
Facility: HOSPITAL | Age: 74
Discharge: HOME | End: 2024-05-09
Attending: EMERGENCY MEDICINE
Payer: MEDICARE

## 2024-05-09 ENCOUNTER — APPOINTMENT (OUTPATIENT)
Facility: HOSPITAL | Age: 74
End: 2024-05-09
Payer: MEDICARE

## 2024-05-09 VITALS
WEIGHT: 168.43 LBS | RESPIRATION RATE: 14 BRPM | TEMPERATURE: 98.4 F | DIASTOLIC BLOOD PRESSURE: 85 MMHG | HEART RATE: 76 BPM | BODY MASS INDEX: 28.06 KG/M2 | SYSTOLIC BLOOD PRESSURE: 153 MMHG | OXYGEN SATURATION: 96 % | HEIGHT: 65 IN

## 2024-05-09 DIAGNOSIS — J34.89 SINUS PRESSURE: Primary | ICD-10-CM

## 2024-05-09 LAB
ALBUMIN SERPL-MCNC: 3.3 G/DL (ref 3.5–5)
ALBUMIN/GLOB SERPL: 1 (ref 1.1–2.2)
ALP SERPL-CCNC: 68 U/L (ref 45–117)
ALT SERPL-CCNC: 26 U/L (ref 12–78)
ANION GAP SERPL CALC-SCNC: 8 MMOL/L (ref 5–15)
AST SERPL-CCNC: 16 U/L (ref 15–37)
BASOPHILS # BLD: 0 K/UL (ref 0–0.1)
BASOPHILS NFR BLD: 1 % (ref 0–1)
BILIRUB SERPL-MCNC: 0.5 MG/DL (ref 0.2–1)
BUN SERPL-MCNC: 14 MG/DL (ref 6–20)
BUN/CREAT SERPL: 15 (ref 12–20)
CALCIUM SERPL-MCNC: 9 MG/DL (ref 8.5–10.1)
CHLORIDE SERPL-SCNC: 106 MMOL/L (ref 97–108)
CO2 SERPL-SCNC: 29 MMOL/L (ref 21–32)
CREAT SERPL-MCNC: 0.93 MG/DL (ref 0.55–1.02)
DIFFERENTIAL METHOD BLD: ABNORMAL
EOSINOPHIL # BLD: 0.1 K/UL (ref 0–0.4)
EOSINOPHIL NFR BLD: 2 % (ref 0–7)
ERYTHROCYTE [DISTWIDTH] IN BLOOD BY AUTOMATED COUNT: 13.3 % (ref 11.5–14.5)
GLOBULIN SER CALC-MCNC: 3.2 G/DL (ref 2–4)
GLUCOSE SERPL-MCNC: 114 MG/DL (ref 65–100)
HCT VFR BLD AUTO: 41.7 % (ref 35–47)
HGB BLD-MCNC: 13.6 G/DL (ref 11.5–16)
IMM GRANULOCYTES # BLD AUTO: 0 K/UL (ref 0–0.04)
IMM GRANULOCYTES NFR BLD AUTO: 0 % (ref 0–0.5)
LYMPHOCYTES # BLD: 1.6 K/UL (ref 0.8–3.5)
LYMPHOCYTES NFR BLD: 26 % (ref 12–49)
MCH RBC QN AUTO: 30.8 PG (ref 26–34)
MCHC RBC AUTO-ENTMCNC: 32.6 G/DL (ref 30–36.5)
MCV RBC AUTO: 94.3 FL (ref 80–99)
MONOCYTES # BLD: 0.6 K/UL (ref 0–1)
MONOCYTES NFR BLD: 10 % (ref 5–13)
NEUTS SEG # BLD: 3.8 K/UL (ref 1.8–8)
NEUTS SEG NFR BLD: 61 % (ref 32–75)
NRBC # BLD: 0 K/UL (ref 0–0.01)
NRBC BLD-RTO: 0 PER 100 WBC
PLATELET # BLD AUTO: 254 K/UL (ref 150–400)
PMV BLD AUTO: 8.6 FL (ref 8.9–12.9)
POTASSIUM SERPL-SCNC: 4.2 MMOL/L (ref 3.5–5.1)
PROT SERPL-MCNC: 6.5 G/DL (ref 6.4–8.2)
RBC # BLD AUTO: 4.42 M/UL (ref 3.8–5.2)
SODIUM SERPL-SCNC: 143 MMOL/L (ref 136–145)
WBC # BLD AUTO: 6.3 K/UL (ref 3.6–11)

## 2024-05-09 PROCEDURE — 80053 COMPREHEN METABOLIC PANEL: CPT

## 2024-05-09 PROCEDURE — 85025 COMPLETE CBC W/AUTO DIFF WBC: CPT

## 2024-05-09 PROCEDURE — 6360000004 HC RX CONTRAST MEDICATION: Performed by: EMERGENCY MEDICINE

## 2024-05-09 PROCEDURE — 70487 CT MAXILLOFACIAL W/DYE: CPT

## 2024-05-09 PROCEDURE — 99285 EMERGENCY DEPT VISIT HI MDM: CPT

## 2024-05-09 PROCEDURE — 36415 COLL VENOUS BLD VENIPUNCTURE: CPT

## 2024-05-09 RX ADMIN — IOPAMIDOL 100 ML: 612 INJECTION, SOLUTION INTRAVENOUS at 12:11

## 2024-05-09 ASSESSMENT — ENCOUNTER SYMPTOMS
DIARRHEA: 0
NAUSEA: 0
COLOR CHANGE: 0
VOMITING: 0
SHORTNESS OF BREATH: 0
BACK PAIN: 0
SINUS PRESSURE: 1
CONSTIPATION: 0
SINUS PAIN: 1
ABDOMINAL PAIN: 0
FACIAL SWELLING: 1

## 2024-05-09 ASSESSMENT — PAIN - FUNCTIONAL ASSESSMENT: PAIN_FUNCTIONAL_ASSESSMENT: NONE - DENIES PAIN

## 2024-05-09 NOTE — ED TRIAGE NOTES
73 year old female pt comes to the ED via POV for a CC Sinusitis and left eye swelling. Pt states that for the last few months she has been suffering with sinusitis and was sent here today by her ENT by Dr. Minal Dee because the last few days she has developed left eye swelling as well. ENT wants her to get a CT scan to rule out cellulitis. If she does have it then she wants her admitted for IV steroids and IV antibiotics. Pt is A&Ox4.

## 2024-05-09 NOTE — ED PROVIDER NOTES
occasionally words are mis-transcribed.)    Scott Kaminski MD (electronically signed)  Attending Emergency Physician           Scott Kaminski MD  05/10/24 [id number]

## 2024-05-09 NOTE — ED NOTES
Consult placed with [facility] ENT, Dr. Minal Dee. Was advised that Dr. Dee is in clinic and would return Dr. Kaminski's request for a call back.

## 2024-11-01 ENCOUNTER — TRANSCRIBE ORDERS (OUTPATIENT)
Facility: HOSPITAL | Age: 74
End: 2024-11-01

## 2024-11-01 DIAGNOSIS — M25.611 DECREASED RANGE OF MOTION OF RIGHT SHOULDER: ICD-10-CM

## 2024-11-01 DIAGNOSIS — M75.51 BURSITIS OF RIGHT SHOULDER: ICD-10-CM

## 2024-11-01 DIAGNOSIS — S46.001A INJURY OF RIGHT ROTATOR CUFF, INITIAL ENCOUNTER: ICD-10-CM

## 2024-11-01 DIAGNOSIS — M25.511 ACUTE PAIN OF RIGHT SHOULDER: Primary | ICD-10-CM

## 2024-11-05 ENCOUNTER — HOSPITAL ENCOUNTER (OUTPATIENT)
Age: 74
Discharge: HOME | End: 2024-11-08
Payer: MEDICARE

## 2024-11-05 DIAGNOSIS — M25.511 ACUTE PAIN OF RIGHT SHOULDER: ICD-10-CM

## 2024-11-05 DIAGNOSIS — M25.611 DECREASED RANGE OF MOTION OF RIGHT SHOULDER: ICD-10-CM

## 2024-11-05 DIAGNOSIS — M75.51 BURSITIS OF RIGHT SHOULDER: ICD-10-CM

## 2024-11-05 DIAGNOSIS — S46.001A INJURY OF RIGHT ROTATOR CUFF, INITIAL ENCOUNTER: ICD-10-CM

## 2024-11-05 PROCEDURE — 73221 MRI JOINT UPR EXTREM W/O DYE: CPT

## 2024-12-06 ENCOUNTER — HOSPITAL ENCOUNTER (EMERGENCY)
Facility: HOSPITAL | Age: 74
Discharge: HOME | End: 2024-12-06
Attending: STUDENT IN AN ORGANIZED HEALTH CARE EDUCATION/TRAINING PROGRAM
Payer: MEDICARE

## 2024-12-06 ENCOUNTER — APPOINTMENT (OUTPATIENT)
Facility: HOSPITAL | Age: 74
End: 2024-12-06
Payer: MEDICARE

## 2024-12-06 VITALS
HEART RATE: 96 BPM | TEMPERATURE: 98.5 F | OXYGEN SATURATION: 94 % | BODY MASS INDEX: 27.99 KG/M2 | HEIGHT: 65 IN | WEIGHT: 168 LBS | RESPIRATION RATE: 19 BRPM | DIASTOLIC BLOOD PRESSURE: 94 MMHG | SYSTOLIC BLOOD PRESSURE: 167 MMHG

## 2024-12-06 DIAGNOSIS — J20.8 ACUTE VIRAL BRONCHITIS: ICD-10-CM

## 2024-12-06 DIAGNOSIS — J10.1 INFLUENZA A: Primary | ICD-10-CM

## 2024-12-06 LAB
ALBUMIN SERPL-MCNC: 3.5 G/DL (ref 3.5–5)
ALBUMIN/GLOB SERPL: 1 (ref 1.1–2.2)
ALP SERPL-CCNC: 83 U/L (ref 45–117)
ALT SERPL-CCNC: 35 U/L (ref 12–78)
ANION GAP SERPL CALC-SCNC: 10 MMOL/L (ref 2–12)
AST SERPL-CCNC: 30 U/L (ref 15–37)
BASOPHILS # BLD: 0 K/UL (ref 0–0.1)
BASOPHILS NFR BLD: 1 % (ref 0–1)
BILIRUB SERPL-MCNC: 0.5 MG/DL (ref 0.2–1)
BUN SERPL-MCNC: 13 MG/DL (ref 6–20)
BUN/CREAT SERPL: 15 (ref 12–20)
CALCIUM SERPL-MCNC: 9.7 MG/DL (ref 8.5–10.1)
CHLORIDE SERPL-SCNC: 99 MMOL/L (ref 97–108)
CO2 SERPL-SCNC: 27 MMOL/L (ref 21–32)
CREAT SERPL-MCNC: 0.86 MG/DL (ref 0.55–1.02)
D DIMER PPP FEU-MCNC: 0.45 MG/L FEU (ref 0–0.65)
DIFFERENTIAL METHOD BLD: ABNORMAL
EKG ATRIAL RATE: 88 BPM
EKG DIAGNOSIS: NORMAL
EKG P AXIS: 24 DEGREES
EKG P-R INTERVAL: 130 MS
EKG Q-T INTERVAL: 358 MS
EKG QRS DURATION: 80 MS
EKG QTC CALCULATION (BAZETT): 433 MS
EKG R AXIS: 17 DEGREES
EKG T AXIS: 11 DEGREES
EKG VENTRICULAR RATE: 88 BPM
EOSINOPHIL # BLD: 0.1 K/UL (ref 0–0.4)
EOSINOPHIL NFR BLD: 2 % (ref 0–7)
ERYTHROCYTE [DISTWIDTH] IN BLOOD BY AUTOMATED COUNT: 14.1 % (ref 11.5–14.5)
FLUAV RNA SPEC QL NAA+PROBE: DETECTED
FLUBV RNA SPEC QL NAA+PROBE: NOT DETECTED
GLOBULIN SER CALC-MCNC: 3.6 G/DL (ref 2–4)
GLUCOSE SERPL-MCNC: 105 MG/DL (ref 65–100)
HCT VFR BLD AUTO: 42.8 % (ref 35–47)
HGB BLD-MCNC: 14.2 G/DL (ref 11.5–16)
IMM GRANULOCYTES # BLD AUTO: 0 K/UL (ref 0–0.04)
IMM GRANULOCYTES NFR BLD AUTO: 0 % (ref 0–0.5)
LYMPHOCYTES # BLD: 1.7 K/UL (ref 0.8–3.5)
LYMPHOCYTES NFR BLD: 30 % (ref 12–49)
MCH RBC QN AUTO: 30.1 PG (ref 26–34)
MCHC RBC AUTO-ENTMCNC: 33.2 G/DL (ref 30–36.5)
MCV RBC AUTO: 90.7 FL (ref 80–99)
MONOCYTES # BLD: 0.6 K/UL (ref 0–1)
MONOCYTES NFR BLD: 10 % (ref 5–13)
NEUTS SEG # BLD: 3.3 K/UL (ref 1.8–8)
NEUTS SEG NFR BLD: 57 % (ref 32–75)
NRBC # BLD: 0 K/UL (ref 0–0.01)
NRBC BLD-RTO: 0 PER 100 WBC
NT PRO BNP: 216 PG/ML (ref 0–125)
PLATELET # BLD AUTO: 222 K/UL (ref 150–400)
PMV BLD AUTO: 8.8 FL (ref 8.9–12.9)
POTASSIUM SERPL-SCNC: 4.1 MMOL/L (ref 3.5–5.1)
PROT SERPL-MCNC: 7.1 G/DL (ref 6.4–8.2)
RBC # BLD AUTO: 4.72 M/UL (ref 3.8–5.2)
SARS-COV-2 RNA RESP QL NAA+PROBE: NOT DETECTED
SODIUM SERPL-SCNC: 136 MMOL/L (ref 136–145)
SOURCE: ABNORMAL
TROPONIN I SERPL HS-MCNC: 14 NG/L (ref 0–51)
WBC # BLD AUTO: 5.7 K/UL (ref 3.6–11)

## 2024-12-06 PROCEDURE — 71046 X-RAY EXAM CHEST 2 VIEWS: CPT

## 2024-12-06 PROCEDURE — 87636 SARSCOV2 & INF A&B AMP PRB: CPT

## 2024-12-06 PROCEDURE — 36415 COLL VENOUS BLD VENIPUNCTURE: CPT

## 2024-12-06 PROCEDURE — 80053 COMPREHEN METABOLIC PANEL: CPT

## 2024-12-06 PROCEDURE — 6370000000 HC RX 637 (ALT 250 FOR IP): Performed by: PHYSICIAN ASSISTANT

## 2024-12-06 PROCEDURE — 84484 ASSAY OF TROPONIN QUANT: CPT

## 2024-12-06 PROCEDURE — 85025 COMPLETE CBC W/AUTO DIFF WBC: CPT

## 2024-12-06 PROCEDURE — 85379 FIBRIN DEGRADATION QUANT: CPT

## 2024-12-06 PROCEDURE — 93005 ELECTROCARDIOGRAM TRACING: CPT | Performed by: STUDENT IN AN ORGANIZED HEALTH CARE EDUCATION/TRAINING PROGRAM

## 2024-12-06 PROCEDURE — 83880 ASSAY OF NATRIURETIC PEPTIDE: CPT

## 2024-12-06 PROCEDURE — 99285 EMERGENCY DEPT VISIT HI MDM: CPT

## 2024-12-06 RX ORDER — ACETAMINOPHEN 500 MG
1000 TABLET ORAL
Status: COMPLETED | OUTPATIENT
Start: 2024-12-06 | End: 2024-12-06

## 2024-12-06 RX ORDER — GUAIFENESIN 600 MG/1
600 TABLET, EXTENDED RELEASE ORAL 2 TIMES DAILY
Qty: 30 TABLET | Refills: 0 | Status: SHIPPED | OUTPATIENT
Start: 2024-12-06 | End: 2024-12-21

## 2024-12-06 RX ORDER — IBUPROFEN 600 MG/1
600 TABLET, FILM COATED ORAL EVERY 6 HOURS PRN
Qty: 28 TABLET | Refills: 0 | Status: SHIPPED | OUTPATIENT
Start: 2024-12-06 | End: 2024-12-13

## 2024-12-06 RX ORDER — ACETAMINOPHEN 500 MG
1000 TABLET ORAL EVERY 6 HOURS PRN
Qty: 56 TABLET | Refills: 0 | Status: SHIPPED | OUTPATIENT
Start: 2024-12-06 | End: 2024-12-13

## 2024-12-06 RX ORDER — BENZONATATE 100 MG/1
100 CAPSULE ORAL 2 TIMES DAILY PRN
Qty: 14 CAPSULE | Refills: 0 | Status: SHIPPED | OUTPATIENT
Start: 2024-12-06 | End: 2024-12-13

## 2024-12-06 RX ADMIN — ACETAMINOPHEN 1000 MG: 500 TABLET ORAL at 14:40

## 2024-12-06 ASSESSMENT — ENCOUNTER SYMPTOMS
WHEEZING: 1
COUGH: 1

## 2024-12-06 ASSESSMENT — LIFESTYLE VARIABLES
HOW OFTEN DO YOU HAVE A DRINK CONTAINING ALCOHOL: NEVER
HOW MANY STANDARD DRINKS CONTAINING ALCOHOL DO YOU HAVE ON A TYPICAL DAY: PATIENT DOES NOT DRINK

## 2024-12-06 ASSESSMENT — PAIN SCALES - GENERAL: PAINLEVEL_OUTOF10: 0

## 2024-12-06 NOTE — ED PROVIDER NOTES
[facility]  EMERGENCY DEPARTMENT ENCOUNTER      Pt Name: Ivania Wolf  MRN: [id number]  Birthdate 1950  Date of evaluation: 12/6/2024  Provider: Gera Evans PA-C    CHIEF COMPLAINT     No chief complaint on file.        HISTORY OF PRESENT ILLNESS   (Location/Symptom, Timing/Onset, Context/Setting, Quality, Duration, Modifying Factors, Severity)  Note limiting factors.   74-year-old F presenting to emergency department for clear productive cough with nasal congestion x 4 days.  Patient reports that she initially was feeling fine but today is not feeling as well.  Feels like she is wheezing.  Concern for pneumonia.  Denies chest pain, shortness of breath.  Reports she just has a heaviness in her chest.  Denies leg swelling.  Patient family member was recently ill with the flu.            Review of External Medical Records:     Nursing Notes were reviewed.    REVIEW OF SYSTEMS    (2-9 systems for level 4, 10 or more for level 5)     Review of Systems   HENT:  Positive for congestion.    Respiratory:  Positive for cough and wheezing.    Cardiovascular:  Positive for chest pain.   All other systems reviewed and are negative.      Except as noted above the remainder of the review of systems was reviewed and negative.       PAST MEDICAL HISTORY     Past Medical History:   Diagnosis Date    Cancer (HCC)     non hodskins folicular lymphoma    Diffuse large B cell lymphoma (HCC)     Non Hodgkin's lymphoma (HCC)     Non Hodgkin's lymphoma (HCC)     Large B-cell         SURGICAL HISTORY       Past Surgical History:   Procedure Laterality Date    BREAST SURGERY      lump from breast benign    GYN      partial hysterectomy         CURRENT MEDICATIONS       Previous Medications    ALPRAZOLAM (XANAX) 0.25 MG TABLET    alprazolam 0.25 mg tablet  TAKE 1 TABLET BY MOUTH TWICE A DAY AS NEEDED  Indications: anxious    ASPIRIN 81 MG EC TABLET    Take 81 mg by mouth daily    BIOTIN 2.5 MG CAPS    Take 2,500 mg by  Sensitivity: 14 [AG]   1358 XR CHEST (2 VW)  Negative pneumonia, negative acute cardiopulmonary process [AG]   1402 Patient will continue at home symptomatic care.  Close follow-up.  Return precautions given to and understood by patient. Clinically stable pt, NAD, VSS, d/c home.     [AG]      ED Course User Index  [AG] Gera Evans PA-C  [SL] Hillary Hutchins MD       CONSULTS:  None    PROCEDURES:  Unless otherwise noted below, none     Procedures      FINAL IMPRESSION      1. Influenza A    2. Acute viral bronchitis          DISPOSITION/PLAN   DISPOSITION Decision To Discharge 12/06/2024 01:58:55 PM      PATIENT REFERRED TO:  Michael Hernandez PA  [address] [facility] [address] [phone]    Schedule an appointment as soon as possible for a visit in 2 days        DISCHARGE MEDICATIONS:  New Prescriptions    ACETAMINOPHEN (TYLENOL) 500 MG TABLET    Take 2 tablets by mouth every 6 hours as needed for Pain    BENZONATATE (TESSALON) 100 MG CAPSULE    Take 1 capsule by mouth 2 times daily as needed for Cough    IBUPROFEN (ADVIL;MOTRIN) 600 MG TABLET    Take 1 tablet by mouth every 6 hours as needed for Pain       (Please note that portions of this note were completed with a voice recognition program.  Efforts were made to edit the dictations but occasionally words are mis-transcribed.)    Gera Evans PA-C (electronically signed)  Emergency Attending Physician / Physician Assistant / Nurse Practitioner             Gera Evans PA-C  12/06/24 1406       Gera Evans PA-C  12/06/24 [id number]

## 2024-12-06 NOTE — ED TRIAGE NOTES
Patient arrives with c/o clear sputum after coughing, feels like she is wheezing, and nasal congestion for 3-4 days. Denies fever. Patient is concerned for bronchitis and pneumonia.

## 2024-12-06 NOTE — ED NOTES
I personally encountered the patient.  I personally directed the diagnostic, therapeutic and consultative decisions.  I reviewed the advanced care practitioner documented history, exam and MDM.  I performed a substantive portion of the medical decision making.    The patient presented with No chief complaint on file.      I personally found patient resting comfortably, no acute distress, normal work of breathing, labs reviewed and notable for flu a positive, patient has had 4 days of symptom and is not a candidate for Tamiflu.  Will send Tessalon, Tylenol Motrin and Mucinex.  Remainder of labs reviewed and reassuring, EKG shows no acute ischemic changes, troponin within normal limits, do not suspect ACS.  D-dimer negative lowers by Wells score rules out PE.  BNP not significantly elevated to suggest volume overload, CBC and CMP reviewed and reassuring.  Chest x-ray shows no focal consolidation to suggest acute bacterial pneumonia.  Advised on flu findings and plan for follow-up discharged stable condition      ICD-10-CM    1. Influenza A  J10.1       2. Acute viral bronchitis  J20.8            MD Liset Ga Shannon C, MD  12/06/24 144

## 2024-12-06 NOTE — DISCHARGE INSTRUCTIONS
Call your primary care provider for close outpatient follow-up.  Make sure that you are staying well-hydrated with 32 to 64 ounces of water daily.  You can rotate ibuprofen and Tylenol for the next 5 days as needed for discomfort and/or fevers.  Tessalon Perles are to help with coughing.  During the day it is good for you to cough in order to clear your lungs.  Hold a large pillow against your chest when coughing so that this will prevent inflammation of the ribs.  At nighttime if you are having coughing that is preventing you from resting, or if you are having severe coughing during the day that is a good time to take the Tessalon Perles.  Take medication as prescribed.  Return immediately if any new or worsening symptoms.  Thank you for allowing us to be a part of your care.

## 2025-04-27 ENCOUNTER — APPOINTMENT (OUTPATIENT)
Facility: HOSPITAL | Age: 75
End: 2025-04-27
Payer: MEDICARE

## 2025-04-27 ENCOUNTER — HOSPITAL ENCOUNTER (EMERGENCY)
Facility: HOSPITAL | Age: 75
Discharge: HOME | End: 2025-04-27
Attending: EMERGENCY MEDICINE
Payer: MEDICARE

## 2025-04-27 VITALS
RESPIRATION RATE: 16 BRPM | HEIGHT: 65 IN | HEART RATE: 71 BPM | TEMPERATURE: 98.3 F | SYSTOLIC BLOOD PRESSURE: 152 MMHG | WEIGHT: 172.4 LBS | DIASTOLIC BLOOD PRESSURE: 79 MMHG | BODY MASS INDEX: 28.72 KG/M2 | OXYGEN SATURATION: 97 %

## 2025-04-27 DIAGNOSIS — M25.562 ACUTE PAIN OF LEFT KNEE: Primary | ICD-10-CM

## 2025-04-27 PROCEDURE — 6370000000 HC RX 637 (ALT 250 FOR IP): Performed by: EMERGENCY MEDICINE

## 2025-04-27 PROCEDURE — 73562 X-RAY EXAM OF KNEE 3: CPT

## 2025-04-27 PROCEDURE — 99283 EMERGENCY DEPT VISIT LOW MDM: CPT

## 2025-04-27 RX ORDER — IBUPROFEN 600 MG/1
600 TABLET, FILM COATED ORAL EVERY 8 HOURS PRN
Qty: 30 TABLET | Refills: 0 | Status: SHIPPED | OUTPATIENT
Start: 2025-04-27

## 2025-04-27 RX ORDER — IBUPROFEN 600 MG/1
600 TABLET, FILM COATED ORAL
Status: COMPLETED | OUTPATIENT
Start: 2025-04-27 | End: 2025-04-27

## 2025-04-27 RX ADMIN — IBUPROFEN 600 MG: 600 TABLET, FILM COATED ORAL at 11:02

## 2025-04-27 NOTE — ED TRIAGE NOTES
Patient arrives with c/o left knee pain for the last 2 weeks without injury. Patient reports her oncologist ordered a DVT doppler that was negative. Patient has been taking tylenol without relief. Hx of lymphoma, advanced degenerative spine. Patient reports not feeling better and concerned for swelling. Gets Ig treatments every 4 weeks.

## 2025-04-27 NOTE — ED NOTES
Pt d/c'd home. No IV.  Pt given d/c instructions and verbalized understanding. Declined w/c and left ambulatory in care of  in stable condition

## 2025-04-27 NOTE — ED PROVIDER NOTES
[facility]  EMERGENCY DEPARTMENT ENCOUNTER      Pt Name: Ivania Wolf  MRN: [id number]  Birthdate 1950  Date of evaluation: 4/27/2025  Provider: Anthony Servin MD    CHIEF COMPLAINT       Chief Complaint   Patient presents with    Knee Pain                HISTORY OF PRESENT ILLNESS   (Location/Symptom, Timing/Onset, Context/Setting, Quality, Duration, Modifying Factors, Severity)  Note limiting factors.   74F w/ hx NHL p/w 2wks knee pain. Pt reports 2wks left anterior knee pain and mild swelling. No falls or trauma. No other joints affected. No ext weakness/numbness. Also notes chronic low back pain that is unchanged today. No rash or redness to area. Pt notes that she \"walks a lot.\" No F/C, CP/SOB or N/V. Had outpatient duplex US 2wks ago that she states was \"negative for DVT.\" Taking tylenol for pain w/o relief.            Review of External Medical Records:     Nursing Notes were reviewed.    REVIEW OF SYSTEMS    (2-9 systems for level 4, 10 or more for level 5)     Review of Systems   Constitutional:  Negative for diaphoresis and fever.   HENT:  Negative for nosebleeds.    Eyes:  Negative for visual disturbance.   Respiratory:  Negative for cough and shortness of breath.    Cardiovascular:  Negative for chest pain, palpitations and leg swelling.   Gastrointestinal:  Negative for abdominal distention, abdominal pain, anal bleeding, blood in stool, diarrhea, nausea and vomiting.   Endocrine: Negative for polyuria.   Genitourinary:  Negative for difficulty urinating, dysuria, frequency and hematuria.   Musculoskeletal:  Positive for arthralgias. Negative for joint swelling.   Skin:  Negative for wound.   Allergic/Immunologic: Negative for immunocompromised state.   Neurological:  Negative for seizures and syncope.   Hematological:  Does not bruise/bleed easily.   Psychiatric/Behavioral:  Negative for confusion.        Except as noted above the remainder of the review of systems